# Patient Record
Sex: FEMALE | Race: OTHER | NOT HISPANIC OR LATINO | Employment: OTHER | URBAN - METROPOLITAN AREA
[De-identification: names, ages, dates, MRNs, and addresses within clinical notes are randomized per-mention and may not be internally consistent; named-entity substitution may affect disease eponyms.]

---

## 2023-08-16 DIAGNOSIS — I48.0 PAROXYSMAL ATRIAL FIBRILLATION: Primary | ICD-10-CM

## 2023-08-23 ENCOUNTER — TELEPHONE (OUTPATIENT)
Dept: ELECTROPHYSIOLOGY | Facility: CLINIC | Age: 83
End: 2023-08-23
Payer: MEDICARE

## 2023-08-28 PROBLEM — I77.9 DISORDER OF CAROTID ARTERY: Status: ACTIVE | Noted: 2023-08-28

## 2023-08-28 PROBLEM — I48.19 PERSISTENT ATRIAL FIBRILLATION: Status: ACTIVE | Noted: 2023-08-28

## 2023-08-28 PROBLEM — I20.89 ATYPICAL ANGINA: Status: ACTIVE | Noted: 2023-08-28

## 2023-08-28 NOTE — PROGRESS NOTES
Subjective:   Patient ID:  Trudy Greenfield is a 82 y.o. female who presents for evaluation of Atrial Fibrillation    Referring Electrophysiologist: Justin Nelson MD    HPI  I had the pleasure of seeing Mrs. Greenfield today in our electrophysiology clinic in consultation for her atrial fibrillation. As you are aware she is a pleasant 82 year-old woman with coronary artery disease s/p PCI, hypertension, CKD stage III, and persistent AF s/p cryoablation in 2021 with subsequent recurrence. Her recurrences have been of persistent AF/atypical atrial flutter. She had repeat cardioversions and placed on amiodarone which she has failed. She feels miserable when not in sinus rhythm. Dr. Nelson did not recommend a repeat ablation and recommend PPM/AVN ablation. She did not desire this unless there was no other option. She is requesting consideration for redo-ablation. At her initial procedure she underwent PVI with cryo and developed atrial flutter. She had a LA roof line and what sounds like anterior mitral isthmus block. She than had CTI ablation with an RF catheter.     My interpretation of today's in-clinic ECG is atypical atrial flutter with variable AV block    Review of Systems   Constitutional: Positive for malaise/fatigue. Negative for fever.   HENT:  Negative for congestion and sore throat.    Eyes:  Negative for blurred vision and visual disturbance.   Cardiovascular:  Negative for chest pain, dyspnea on exertion, irregular heartbeat, near-syncope, palpitations and syncope.   Respiratory:  Negative for cough and shortness of breath.    Hematologic/Lymphatic: Negative for bleeding problem. Does not bruise/bleed easily.   Skin: Negative.    Musculoskeletal: Negative.    Gastrointestinal:  Negative for bloating, abdominal pain, hematochezia and melena.   Neurological:  Negative for focal weakness and weakness.   Psychiatric/Behavioral: Negative.         Objective:   Physical Exam  Vitals reviewed.   Constitutional:        General: She is not in acute distress.     Appearance: She is well-developed. She is not diaphoretic.   HENT:      Head: Normocephalic and atraumatic.   Eyes:      General:         Right eye: No discharge.         Left eye: No discharge.      Conjunctiva/sclera: Conjunctivae normal.   Cardiovascular:      Rate and Rhythm: Normal rate. Rhythm irregular.      Heart sounds: No murmur heard.     No friction rub. No gallop.   Pulmonary:      Effort: Pulmonary effort is normal. No respiratory distress.      Breath sounds: Normal breath sounds. No wheezing or rales.   Abdominal:      General: Bowel sounds are normal. There is no distension.      Palpations: Abdomen is soft.      Tenderness: There is no abdominal tenderness.   Musculoskeletal:      Cervical back: Neck supple.   Skin:     General: Skin is warm and dry.   Neurological:      Mental Status: She is alert and oriented to person, place, and time.   Psychiatric:         Behavior: Behavior normal.         Thought Content: Thought content normal.         Judgment: Judgment normal.         Assessment:      1. Persistent atrial fibrillation    2. Atypical atrial flutter    3. Disorder of carotid artery    4. Essential hypertension    5. Renal artery stenosis    6. Coronary artery disease involving native coronary artery of native heart without angina pectoris (had prior PCI)        Plan:   In summary, Mrs. Greenfield is a pleasant 82 year-old woman with coronary artery disease s/p PCI, hypertension, CKD stage III, and persistent AF s/p cryoablation in 2021 with subsequent recurrence despite amiodarone. She is in an atypical atrial flutter. She had a CTI ablation and LA roof line and (?)anterior mitral isthmus(?) ablation with cryo. She declines PPM/AVN currently. I offered redo-ablation. I spent about a half hour discussing the nature of PVI including transseptal puncture. We discussed risks and benefits at length. Our discussion included, but was not limited to the  risk of death, infection, bleeding, stroke, MI, cardiac perforation, embolism, cardiac tamponade, vascular injury, valvular injury, AE fistula, injury to phrenic nerve, pulmonary vein stenosis and other organic injury including the possibility for need for surgery or pacemaker implantation.  She understood and desires to proceed. All questions answered.    Plan  Redo-PVI, atypical atrial flutter ablation  Carto  Anesthesia  LEONEL day of, cancel if in sinus  Hold eliquis AM of procedure  Will resume amiodarone after  Will give 1 month of protonix after    Thank you for allowing me to participate in the care of this patient. Please do not hesitate to call me with any questions or concerns.    Steven Zhou MD, PhD  Cardiac Electrophysiology

## 2023-08-29 ENCOUNTER — HOSPITAL ENCOUNTER (OUTPATIENT)
Dept: CARDIOLOGY | Facility: CLINIC | Age: 83
Discharge: HOME | End: 2023-08-29
Payer: MEDICARE

## 2023-08-29 ENCOUNTER — TELEPHONE (OUTPATIENT)
Dept: ELECTROPHYSIOLOGY | Facility: CLINIC | Age: 83
End: 2023-08-29
Payer: MEDICARE

## 2023-08-29 ENCOUNTER — OFFICE VISIT (OUTPATIENT)
Dept: ELECTROPHYSIOLOGY | Facility: CLINIC | Age: 83
End: 2023-08-29
Payer: MEDICARE

## 2023-08-29 VITALS
HEIGHT: 68 IN | BODY MASS INDEX: 32.61 KG/M2 | WEIGHT: 215.19 LBS | HEART RATE: 97 BPM | SYSTOLIC BLOOD PRESSURE: 128 MMHG | DIASTOLIC BLOOD PRESSURE: 60 MMHG

## 2023-08-29 DIAGNOSIS — I10 ESSENTIAL HYPERTENSION: ICD-10-CM

## 2023-08-29 DIAGNOSIS — I48.4 ATYPICAL ATRIAL FLUTTER: ICD-10-CM

## 2023-08-29 DIAGNOSIS — I77.9 DISORDER OF CAROTID ARTERY: ICD-10-CM

## 2023-08-29 DIAGNOSIS — I25.10 CORONARY ARTERY DISEASE INVOLVING NATIVE CORONARY ARTERY OF NATIVE HEART WITHOUT ANGINA PECTORIS: ICD-10-CM

## 2023-08-29 DIAGNOSIS — I70.1 RENAL ARTERY STENOSIS: ICD-10-CM

## 2023-08-29 DIAGNOSIS — I48.19 PERSISTENT ATRIAL FIBRILLATION: Primary | ICD-10-CM

## 2023-08-29 DIAGNOSIS — I48.0 PAROXYSMAL ATRIAL FIBRILLATION: ICD-10-CM

## 2023-08-29 PROCEDURE — 93005 RHYTHM STRIP: ICD-10-PCS | Mod: S$GLB,,, | Performed by: INTERNAL MEDICINE

## 2023-08-29 PROCEDURE — 1160F RVW MEDS BY RX/DR IN RCRD: CPT | Mod: CPTII,S$GLB,, | Performed by: INTERNAL MEDICINE

## 2023-08-29 PROCEDURE — 1160F PR REVIEW ALL MEDS BY PRESCRIBER/CLIN PHARMACIST DOCUMENTED: ICD-10-PCS | Mod: CPTII,S$GLB,, | Performed by: INTERNAL MEDICINE

## 2023-08-29 PROCEDURE — 93010 ELECTROCARDIOGRAM REPORT: CPT | Mod: S$GLB,,, | Performed by: INTERNAL MEDICINE

## 2023-08-29 PROCEDURE — 1159F PR MEDICATION LIST DOCUMENTED IN MEDICAL RECORD: ICD-10-PCS | Mod: CPTII,S$GLB,, | Performed by: INTERNAL MEDICINE

## 2023-08-29 PROCEDURE — 1101F PT FALLS ASSESS-DOCD LE1/YR: CPT | Mod: CPTII,S$GLB,, | Performed by: INTERNAL MEDICINE

## 2023-08-29 PROCEDURE — 3288F FALL RISK ASSESSMENT DOCD: CPT | Mod: CPTII,S$GLB,, | Performed by: INTERNAL MEDICINE

## 2023-08-29 PROCEDURE — 1159F MED LIST DOCD IN RCRD: CPT | Mod: CPTII,S$GLB,, | Performed by: INTERNAL MEDICINE

## 2023-08-29 PROCEDURE — 3074F PR MOST RECENT SYSTOLIC BLOOD PRESSURE < 130 MM HG: ICD-10-PCS | Mod: CPTII,S$GLB,, | Performed by: INTERNAL MEDICINE

## 2023-08-29 PROCEDURE — 3078F PR MOST RECENT DIASTOLIC BLOOD PRESSURE < 80 MM HG: ICD-10-PCS | Mod: CPTII,S$GLB,, | Performed by: INTERNAL MEDICINE

## 2023-08-29 PROCEDURE — 99999 PR PBB SHADOW E&M-EST. PATIENT-LVL III: ICD-10-PCS | Mod: PBBFAC,,, | Performed by: INTERNAL MEDICINE

## 2023-08-29 PROCEDURE — 3074F SYST BP LT 130 MM HG: CPT | Mod: CPTII,S$GLB,, | Performed by: INTERNAL MEDICINE

## 2023-08-29 PROCEDURE — 1126F PR PAIN SEVERITY QUANTIFIED, NO PAIN PRESENT: ICD-10-PCS | Mod: CPTII,S$GLB,, | Performed by: INTERNAL MEDICINE

## 2023-08-29 PROCEDURE — 93010 RHYTHM STRIP: ICD-10-PCS | Mod: S$GLB,,, | Performed by: INTERNAL MEDICINE

## 2023-08-29 PROCEDURE — 99205 PR OFFICE/OUTPT VISIT, NEW, LEVL V, 60-74 MIN: ICD-10-PCS | Mod: S$GLB,,, | Performed by: INTERNAL MEDICINE

## 2023-08-29 PROCEDURE — 99999 PR PBB SHADOW E&M-EST. PATIENT-LVL III: CPT | Mod: PBBFAC,,, | Performed by: INTERNAL MEDICINE

## 2023-08-29 PROCEDURE — 93005 ELECTROCARDIOGRAM TRACING: CPT | Mod: S$GLB,,, | Performed by: INTERNAL MEDICINE

## 2023-08-29 PROCEDURE — 3288F PR FALLS RISK ASSESSMENT DOCUMENTED: ICD-10-PCS | Mod: CPTII,S$GLB,, | Performed by: INTERNAL MEDICINE

## 2023-08-29 PROCEDURE — 1126F AMNT PAIN NOTED NONE PRSNT: CPT | Mod: CPTII,S$GLB,, | Performed by: INTERNAL MEDICINE

## 2023-08-29 PROCEDURE — 1101F PR PT FALLS ASSESS DOC 0-1 FALLS W/OUT INJ PAST YR: ICD-10-PCS | Mod: CPTII,S$GLB,, | Performed by: INTERNAL MEDICINE

## 2023-08-29 PROCEDURE — 3078F DIAST BP <80 MM HG: CPT | Mod: CPTII,S$GLB,, | Performed by: INTERNAL MEDICINE

## 2023-08-29 PROCEDURE — 99205 OFFICE O/P NEW HI 60 MIN: CPT | Mod: S$GLB,,, | Performed by: INTERNAL MEDICINE

## 2023-08-29 RX ORDER — EZETIMIBE 10 MG/1
10 TABLET ORAL DAILY
COMMUNITY
End: 2023-09-12 | Stop reason: SDUPTHER

## 2023-08-29 RX ORDER — AMIODARONE HYDROCHLORIDE 200 MG/1
1 TABLET ORAL 2 TIMES DAILY
COMMUNITY
End: 2023-09-12

## 2023-08-29 RX ORDER — LEVOTHYROXINE SODIUM 100 UG/1
100 TABLET ORAL
COMMUNITY

## 2023-08-29 RX ORDER — ASPIRIN 81 MG/1
81 TABLET ORAL
COMMUNITY

## 2023-08-29 RX ORDER — METOPROLOL SUCCINATE 100 MG/1
100 TABLET, EXTENDED RELEASE ORAL DAILY
COMMUNITY
End: 2023-09-12 | Stop reason: SDUPTHER

## 2023-08-29 RX ORDER — CHLORTHALIDONE 25 MG/1
25 TABLET ORAL DAILY
COMMUNITY
End: 2023-09-12 | Stop reason: SDUPTHER

## 2023-08-29 RX ORDER — ATORVASTATIN CALCIUM 20 MG/1
20 TABLET, FILM COATED ORAL DAILY
COMMUNITY
End: 2023-09-12 | Stop reason: SDUPTHER

## 2023-08-29 RX ORDER — MAGNESIUM GLUCONATE 27.5 (500)
TABLET ORAL ONCE
COMMUNITY
End: 2023-09-12 | Stop reason: DRUGHIGH

## 2023-08-29 RX ORDER — FUROSEMIDE 20 MG/1
20 TABLET ORAL
COMMUNITY
End: 2023-09-12 | Stop reason: SDUPTHER

## 2023-08-29 RX ORDER — FERROUS SULFATE 325(65) MG
325 TABLET ORAL
COMMUNITY

## 2023-08-29 RX ORDER — FLUTICASONE PROPIONATE 50 UG/1
2 POWDER, METERED RESPIRATORY (INHALATION) DAILY
COMMUNITY

## 2023-08-29 RX ORDER — LORATADINE 10 MG/1
10 TABLET ORAL DAILY
COMMUNITY

## 2023-08-29 NOTE — TELEPHONE ENCOUNTER
Spoke with Patient . Scheduled for PVI ablation  procedure on 11/13/2023 with Dr Zhou. Procedure details reviewed and advised that pre procedure patient instructions will be sent via portal and mail as requested. Advised to call the office for any questions or concerns prior to scheduled procedure. Understanding verbalized.

## 2023-09-12 ENCOUNTER — OFFICE VISIT (OUTPATIENT)
Dept: CARDIOLOGY | Facility: CLINIC | Age: 83
End: 2023-09-12
Payer: MEDICARE

## 2023-09-12 ENCOUNTER — TELEPHONE (OUTPATIENT)
Dept: ELECTROPHYSIOLOGY | Facility: CLINIC | Age: 83
End: 2023-09-12
Payer: MEDICARE

## 2023-09-12 VITALS
BODY MASS INDEX: 32.29 KG/M2 | WEIGHT: 213.06 LBS | DIASTOLIC BLOOD PRESSURE: 64 MMHG | HEIGHT: 68 IN | SYSTOLIC BLOOD PRESSURE: 130 MMHG | HEART RATE: 88 BPM

## 2023-09-12 DIAGNOSIS — I48.19 PERSISTENT ATRIAL FIBRILLATION: Primary | ICD-10-CM

## 2023-09-12 DIAGNOSIS — I25.10 CORONARY ARTERY DISEASE INVOLVING NATIVE CORONARY ARTERY OF NATIVE HEART WITHOUT ANGINA PECTORIS: ICD-10-CM

## 2023-09-12 DIAGNOSIS — I65.21 STENOSIS OF RIGHT CAROTID ARTERY: ICD-10-CM

## 2023-09-12 DIAGNOSIS — E78.00 HYPERCHOLESTEREMIA: ICD-10-CM

## 2023-09-12 DIAGNOSIS — I70.1 RENAL ARTERY STENOSIS: ICD-10-CM

## 2023-09-12 DIAGNOSIS — Z95.5 S/P CORONARY ARTERY STENT PLACEMENT: ICD-10-CM

## 2023-09-12 DIAGNOSIS — I10 PRIMARY HYPERTENSION: ICD-10-CM

## 2023-09-12 PROCEDURE — 99214 PR OFFICE/OUTPT VISIT, EST, LEVL IV, 30-39 MIN: ICD-10-PCS | Mod: S$GLB,,, | Performed by: INTERNAL MEDICINE

## 2023-09-12 PROCEDURE — 1126F AMNT PAIN NOTED NONE PRSNT: CPT | Mod: CPTII,S$GLB,, | Performed by: INTERNAL MEDICINE

## 2023-09-12 PROCEDURE — 1160F PR REVIEW ALL MEDS BY PRESCRIBER/CLIN PHARMACIST DOCUMENTED: ICD-10-PCS | Mod: CPTII,S$GLB,, | Performed by: INTERNAL MEDICINE

## 2023-09-12 PROCEDURE — 1126F PR PAIN SEVERITY QUANTIFIED, NO PAIN PRESENT: ICD-10-PCS | Mod: CPTII,S$GLB,, | Performed by: INTERNAL MEDICINE

## 2023-09-12 PROCEDURE — 3078F DIAST BP <80 MM HG: CPT | Mod: CPTII,S$GLB,, | Performed by: INTERNAL MEDICINE

## 2023-09-12 PROCEDURE — 1159F PR MEDICATION LIST DOCUMENTED IN MEDICAL RECORD: ICD-10-PCS | Mod: CPTII,S$GLB,, | Performed by: INTERNAL MEDICINE

## 2023-09-12 PROCEDURE — 1159F MED LIST DOCD IN RCRD: CPT | Mod: CPTII,S$GLB,, | Performed by: INTERNAL MEDICINE

## 2023-09-12 PROCEDURE — 99999 PR PBB SHADOW E&M-EST. PATIENT-LVL III: ICD-10-PCS | Mod: PBBFAC,,, | Performed by: INTERNAL MEDICINE

## 2023-09-12 PROCEDURE — 1100F PTFALLS ASSESS-DOCD GE2>/YR: CPT | Mod: CPTII,S$GLB,, | Performed by: INTERNAL MEDICINE

## 2023-09-12 PROCEDURE — 3288F FALL RISK ASSESSMENT DOCD: CPT | Mod: CPTII,S$GLB,, | Performed by: INTERNAL MEDICINE

## 2023-09-12 PROCEDURE — 1160F RVW MEDS BY RX/DR IN RCRD: CPT | Mod: CPTII,S$GLB,, | Performed by: INTERNAL MEDICINE

## 2023-09-12 PROCEDURE — 99214 OFFICE O/P EST MOD 30 MIN: CPT | Mod: S$GLB,,, | Performed by: INTERNAL MEDICINE

## 2023-09-12 PROCEDURE — 3078F PR MOST RECENT DIASTOLIC BLOOD PRESSURE < 80 MM HG: ICD-10-PCS | Mod: CPTII,S$GLB,, | Performed by: INTERNAL MEDICINE

## 2023-09-12 PROCEDURE — 1100F PR PT FALLS ASSESS DOC 2+ FALLS/FALL W/INJURY/YR: ICD-10-PCS | Mod: CPTII,S$GLB,, | Performed by: INTERNAL MEDICINE

## 2023-09-12 PROCEDURE — 3288F PR FALLS RISK ASSESSMENT DOCUMENTED: ICD-10-PCS | Mod: CPTII,S$GLB,, | Performed by: INTERNAL MEDICINE

## 2023-09-12 PROCEDURE — 3075F SYST BP GE 130 - 139MM HG: CPT | Mod: CPTII,S$GLB,, | Performed by: INTERNAL MEDICINE

## 2023-09-12 PROCEDURE — 99999 PR PBB SHADOW E&M-EST. PATIENT-LVL III: CPT | Mod: PBBFAC,,, | Performed by: INTERNAL MEDICINE

## 2023-09-12 PROCEDURE — 3075F PR MOST RECENT SYSTOLIC BLOOD PRESS GE 130-139MM HG: ICD-10-PCS | Mod: CPTII,S$GLB,, | Performed by: INTERNAL MEDICINE

## 2023-09-12 RX ORDER — ATORVASTATIN CALCIUM 20 MG/1
20 TABLET, FILM COATED ORAL DAILY
Qty: 90 TABLET | Refills: 3 | Status: SHIPPED | OUTPATIENT
Start: 2023-09-12

## 2023-09-12 RX ORDER — FUROSEMIDE 20 MG/1
20 TABLET ORAL
Qty: 30 TABLET | Refills: 6 | Status: SHIPPED | OUTPATIENT
Start: 2023-09-12

## 2023-09-12 RX ORDER — EZETIMIBE 10 MG/1
10 TABLET ORAL DAILY
Qty: 90 TABLET | Refills: 3 | Status: SHIPPED | OUTPATIENT
Start: 2023-09-12

## 2023-09-12 RX ORDER — METOPROLOL SUCCINATE 100 MG/1
TABLET, EXTENDED RELEASE ORAL
Qty: 270 TABLET | Refills: 3 | Status: ON HOLD | OUTPATIENT
Start: 2023-09-12 | End: 2023-11-14 | Stop reason: SDUPTHER

## 2023-09-12 RX ORDER — CHLORTHALIDONE 25 MG/1
25 TABLET ORAL DAILY
Qty: 90 TABLET | Refills: 3 | Status: SHIPPED | OUTPATIENT
Start: 2023-09-12

## 2023-09-12 NOTE — PROGRESS NOTES
Subjective:     Chief Complaint:  Trudy Greenfield is a 83 y.o. female referred by Derrell Freeman MD for evaluation of Coronary Artery Disease and Hypertension.    Problem List and HPI:   CAD  - LAD stent 6/2023  CEA 2018  Atrial fib    Accompanied by her daughter Katalina.  This is her first visit with me.  She used to see Dr. Zen Garber, then Dr. Alejandrina Ruiz at . Dr. Tierney was her vascular surgeon. Dr. Ocasio was her electrophysiologist at  but he is leaving. She follows w Dr. Yoon in Nephrology.  She has been in atrial fib since 6/2023 and has undergone cardioversion 3 times.  Dr. Zhou plans to perform an ablation in [facility].  She takes Lasix p.r.n. for weight gain.  She has required it only 3 times in the past 6 weeks.  20 mg did not work so she has taken 40 mg        Review of patient's allergies indicates:   Allergen Reactions    Iodine Other (See Comments)    Codeine Rash        Current Outpatient Medications   Medication Sig    apixaban (ELIQUIS) 5 mg Tab Take 5 mg by mouth 2 (two) times a day.    aspirin (ECOTRIN) 81 MG EC tablet Take 81 mg by mouth every Mon, Wed, Fri.    atorvastatin (LIPITOR) 20 MG tablet Take 20 mg by mouth once daily.    chlorthalidone (HYGROTEN) 25 MG Tab Take 25 mg by mouth once daily.    ezetimibe (ZETIA) 10 mg tablet Take 10 mg by mouth once daily.    ferrous sulfate (FEOSOL) 325 mg (65 mg iron) Tab tablet Take 325 mg by mouth daily with breakfast.    fluticasone propionate (FLOVENT DISKUS) 50 mcg/actuation DsDv Inhale 2 sprays into the lungs once daily. Controller    furosemide (LASIX) 20 MG tablet Take 20 mg by mouth as needed.    levothyroxine (SYNTHROID) 100 MCG tablet Take 100 mcg by mouth before breakfast.    loratadine (CLARITIN) 10 mg tablet Take 10 mg by mouth once daily.    MAGNESIUM GLYCINATE ORAL Take 1 tablet by mouth 3 (three) times daily. 400mg total for the day    metoprolol succinate (TOPROL-XL) 100 MG 24 hr tablet Take 100 mg by mouth once  "daily. 200mg in AM, 100mg in PM    ubidecarenone (COENZYME Q10 ORAL) Take 1 capsule by mouth once daily.    vit A/vit C/vit E/zinc/copper (PRESERVISION AREDS ORAL) Take 1 tablet by mouth 2 (two) times a day.         Past Medical and Surgical history:  Trudy Greenfield  has no past medical history on file.   No past surgical history on file.    Social history:  Trudy Greenfield      Family history:  Trudy's family history is not on file.      Objective:   /64   Pulse 88   Ht 5' 8" (1.727 m)   Wt 96.6 kg (213 lb 1.2 oz)   LMP  (LMP Unknown)   BMI 32.40 kg/m²    Physical Exam  Constitutional:       Appearance: Normal appearance. She is well-developed.   Neck:      Vascular: No JVD.   Cardiovascular:      Rate and Rhythm: Normal rate and regular rhythm.      Pulses:           Radial pulses are 2+ on the right side and 2+ on the left side.      Heart sounds: S1 normal and S2 normal. No murmur heard.  Pulmonary:      Breath sounds: No decreased breath sounds, wheezing or rales.   Chest:      Chest wall: There is no dullness to percussion.   Abdominal:      Palpations: Abdomen is soft. There is no hepatomegaly or splenomegaly.      Tenderness: There is no abdominal tenderness.   Musculoskeletal:      Right lower leg: No edema.      Left lower leg: No edema.   Skin:     General: Skin is warm.      Findings: No bruising.      Nails: There is no clubbing.   Neurological:      Mental Status: She is alert and oriented to person, place, and time.   Psychiatric:         Speech: Speech normal.         Behavior: Behavior normal.           Labs  Lipids:  Recent Labs   Lab 05/03/23  0859   LDL Calculated 68   HDL 62   Cholesterol 141            Assessment and Plan:       ICD-10-CM ICD-9-CM   1. Persistent atrial fibrillation  I48.19 427.31   2. Renal artery stenosis  I70.1 440.1   3. CAD w/o angina  I25.10 414.01   4. S/P coronary artery stent placement  Z95.5 V45.82   5. Stenosis of right carotid artery s/p CEA  I65.21 433.10 "   6. Hypercholesteremia  E78.00 272.0   7. Primary hypertension  I10 401.9            Orders entered during this encounter:    Persistent atrial fibrillation  -     apixaban (ELIQUIS) 5 mg Tab; Take 1 tablet (5 mg total) by mouth 2 (two) times a day.  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  Dispense: 90 tablet; Refill: 3  -     atorvastatin (LIPITOR) 20 MG tablet; Take 1 tablet (20 mg total) by mouth once daily.  Dispense: 90 tablet; Refill: 3  -     Lipid Panel; Future; Expected date: 01/22/2024    Primary hypertension  -     chlorthalidone (HYGROTEN) 25 MG Tab; Take 1 tablet (25 mg total) by mouth once daily.  Dispense: 90 tablet; Refill: 3  -     Comprehensive Metabolic Panel; Future; Expected date: 01/22/2024    Other orders  -     furosemide (LASIX) 20 MG tablet; Take 1 tablet (20 mg total) by mouth as needed.  Dispense: 30 tablet; Refill: 6         No follow-ups on file.

## 2023-09-20 DIAGNOSIS — I48.19 PERSISTENT ATRIAL FIBRILLATION: Primary | ICD-10-CM

## 2023-09-21 ENCOUNTER — PATIENT MESSAGE (OUTPATIENT)
Dept: ELECTROPHYSIOLOGY | Facility: CLINIC | Age: 83
End: 2023-09-21
Payer: MEDICARE

## 2023-11-06 LAB
ANION GAP SERPL CALC-SCNC: 7 MMOL/L (ref 8–16)
APTT PPP: 36.3 SECONDS (ref 22.7–33.4)
BUN BLD-MCNC: 31 MG/DL (ref 7–25)
CALCIUM SERPL-MCNC: 8.6 MG/DL (ref 8.4–10.3)
CHLORIDE SERPL-SCNC: 104 MMOL/L (ref 96–110)
CO2 SERPL-SCNC: 32 MMOL/L (ref 24–32)
CREAT SERPL-MCNC: 1.33 MG/DL (ref 0.5–1.1)
EGFR: 40 ML/MIN/1.73M2
ERYTHROCYTE [DISTWIDTH] IN BLOOD BY AUTOMATED COUNT: 16.6 % (ref 11.5–14.5)
GLUCOSE SERPL-MCNC: 99 MG/DL (ref 65–99)
HCT VFR BLD AUTO: 44.4 % (ref 35–46)
HGB BLD-MCNC: 14.2 GM/DL (ref 12–16)
INR PPP: 1.2 (ref 0.8–1.2)
MCH RBC QN AUTO: 28.6 PG (ref 26–34)
MCHC RBC AUTO-ENTMCNC: 32.1 G/DL
MCV RBC AUTO: 89.2 FL (ref 80–100)
PLATELET # BLD AUTO: 183 THOUSAND/UL (ref 130–400)
PMV BLD AUTO: 10.6 FL (ref 7.4–10.4)
POTASSIUM SERPL-SCNC: 4.7 MMOL/L (ref 3.6–5.2)
PROTHROMBIN TIME: 15.6 SECONDS (ref 12.3–14.7)
RBC # BLD AUTO: 4.98 MILLION/UL (ref 4–5.2)
SODIUM BLD-SCNC: 143 MMOL/L (ref 135–146)
WBC # BLD AUTO: 8 THOUSAND/UL (ref 4.5–11)

## 2023-11-08 DIAGNOSIS — I65.21 STENOSIS OF RIGHT CAROTID ARTERY: Primary | ICD-10-CM

## 2023-11-10 ENCOUNTER — TELEPHONE (OUTPATIENT)
Dept: ELECTROPHYSIOLOGY | Facility: CLINIC | Age: 83
End: 2023-11-10
Payer: MEDICARE

## 2023-11-10 NOTE — TELEPHONE ENCOUNTER
Spoke to Patient    CONFIRMED procedure arrival time of 10 am on 11/10/2023 for PVI /Atyp AFL ablation with Dr Zhou.     Reiterated instructions including:    -Directions to check in desk.    -NPO after midnight night prior to procedure. Fasting upon arrival to the hospital the day of the procedure. .    -High importance of HOLDING Metoprolol x 2 : Eliquis and Lasix to be held the morning of the procedure.     -Confirmed compliance of Eliquis as prescribed    -Pre-procedure LABS reviewed with no alerts noted.     -Confirmed absence or presence of implanted device/stimulator N/A    -Confirmed no recent or current fever, cough, or shortness of breath .    -Confirmed no redness, rash, irritation, or yeast infection to skin/ chest / groin area.         -Bathe night prior and morning prior to procedure with Hibiclens solution or an antibacterial soap  -Reviewed current visitor policy    Patient verbalized understanding of above, denies any further questions and appreciated the call.   
None

## 2023-11-13 ENCOUNTER — ANESTHESIA EVENT (OUTPATIENT)
Dept: MEDSURG UNIT | Facility: HOSPITAL | Age: 83
End: 2023-11-13
Payer: MEDICARE

## 2023-11-13 ENCOUNTER — HOSPITAL ENCOUNTER (OUTPATIENT)
Dept: CARDIOLOGY | Facility: HOSPITAL | Age: 83
Discharge: HOME | End: 2023-11-13
Attending: INTERNAL MEDICINE | Admitting: INTERNAL MEDICINE
Payer: MEDICARE

## 2023-11-13 ENCOUNTER — HOSPITAL ENCOUNTER (OUTPATIENT)
Facility: HOSPITAL | Age: 83
Discharge: HOME | End: 2023-11-14
Attending: INTERNAL MEDICINE | Admitting: INTERNAL MEDICINE
Payer: MEDICARE

## 2023-11-13 ENCOUNTER — ANESTHESIA (OUTPATIENT)
Dept: MEDSURG UNIT | Facility: HOSPITAL | Age: 83
End: 2023-11-13
Payer: MEDICARE

## 2023-11-13 VITALS
SYSTOLIC BLOOD PRESSURE: 135 MMHG | BODY MASS INDEX: 32.43 KG/M2 | WEIGHT: 214 LBS | HEIGHT: 68 IN | DIASTOLIC BLOOD PRESSURE: 81 MMHG

## 2023-11-13 DIAGNOSIS — I48.19 PERSISTENT ATRIAL FIBRILLATION: ICD-10-CM

## 2023-11-13 DIAGNOSIS — I48.92 ATRIAL FIBRILLATION AND FLUTTER: Primary | ICD-10-CM

## 2023-11-13 DIAGNOSIS — I48.91 ATRIAL FIBRILLATION AND FLUTTER: Primary | ICD-10-CM

## 2023-11-13 DIAGNOSIS — I48.11 LONGSTANDING PERSISTENT ATRIAL FIBRILLATION: ICD-10-CM

## 2023-11-13 DIAGNOSIS — I49.9 ARRHYTHMIA: ICD-10-CM

## 2023-11-13 DIAGNOSIS — I48.92 ATRIAL FLUTTER: ICD-10-CM

## 2023-11-13 LAB
ASCENDING AORTA: 2.4 CM
BSA FOR ECHO PROCEDURE: 2.16 M2
POC ACTIVATED CLOTTING TIME K: 149 SEC (ref 74–137)
POC ACTIVATED CLOTTING TIME K: 155 SEC (ref 74–137)
POC ACTIVATED CLOTTING TIME K: 354 SEC (ref 74–137)
POC ACTIVATED CLOTTING TIME K: 359 SEC (ref 74–137)
POC ACTIVATED CLOTTING TIME K: 407 SEC (ref 74–137)
POC ACTIVATED CLOTTING TIME K: 510 SEC (ref 74–137)
SAMPLE: ABNORMAL
SINUS: 3 CM
STJ: 2.5 CM

## 2023-11-13 PROCEDURE — 93462 L HRT CATH TRNSPTL PUNCTURE: CPT | Performed by: INTERNAL MEDICINE

## 2023-11-13 PROCEDURE — 93010 ELECTROCARDIOGRAM REPORT: CPT | Mod: ,,, | Performed by: INTERNAL MEDICINE

## 2023-11-13 PROCEDURE — 25000003 PHARM REV CODE 250: Performed by: INTERNAL MEDICINE

## 2023-11-13 PROCEDURE — 36620 ARTERIAL: ICD-10-PCS | Mod: 59,,, | Performed by: ANESTHESIOLOGY

## 2023-11-13 PROCEDURE — 93312 ECHO TRANSESOPHAGEAL: CPT | Mod: 26,,, | Performed by: INTERNAL MEDICINE

## 2023-11-13 PROCEDURE — 93655 ICAR CATH ABLTJ DSCRT ARRHYT: CPT | Mod: ,,, | Performed by: INTERNAL MEDICINE

## 2023-11-13 PROCEDURE — 63600175 PHARM REV CODE 636 W HCPCS: Performed by: STUDENT IN AN ORGANIZED HEALTH CARE EDUCATION/TRAINING PROGRAM

## 2023-11-13 PROCEDURE — D9220A PRA ANESTHESIA: Mod: ANES,,, | Performed by: ANESTHESIOLOGY

## 2023-11-13 PROCEDURE — 93010 EKG 12-LEAD: ICD-10-PCS | Mod: ,,, | Performed by: INTERNAL MEDICINE

## 2023-11-13 PROCEDURE — 93320 DOPPLER ECHO COMPLETE: CPT | Mod: 26,,, | Performed by: INTERNAL MEDICINE

## 2023-11-13 PROCEDURE — D9220A PRA ANESTHESIA: ICD-10-PCS | Mod: CRNA,,, | Performed by: NURSE ANESTHETIST, CERTIFIED REGISTERED

## 2023-11-13 PROCEDURE — 93662 INTRACARDIAC ECG (ICE): CPT | Performed by: INTERNAL MEDICINE

## 2023-11-13 PROCEDURE — 93653 COMPRE EP EVAL TX SVT: CPT | Performed by: INTERNAL MEDICINE

## 2023-11-13 PROCEDURE — 93320 TRANSESOPHAGEAL ECHO (TEE) (CUPID ONLY): ICD-10-PCS | Mod: 26,,, | Performed by: INTERNAL MEDICINE

## 2023-11-13 PROCEDURE — 93325 DOPPLER ECHO COLOR FLOW MAPG: CPT | Mod: 26,,, | Performed by: INTERNAL MEDICINE

## 2023-11-13 PROCEDURE — 37000009 HC ANESTHESIA EA ADD 15 MINS: Performed by: INTERNAL MEDICINE

## 2023-11-13 PROCEDURE — C1732 CATH, EP, DIAG/ABL, 3D/VECT: HCPCS | Performed by: INTERNAL MEDICINE

## 2023-11-13 PROCEDURE — 36620 INSERTION CATHETER ARTERY: CPT | Mod: 59,,, | Performed by: ANESTHESIOLOGY

## 2023-11-13 PROCEDURE — D9220A PRA ANESTHESIA: ICD-10-PCS | Mod: ANES,,, | Performed by: ANESTHESIOLOGY

## 2023-11-13 PROCEDURE — C1753 CATH, INTRAVAS ULTRASOUND: HCPCS | Performed by: INTERNAL MEDICINE

## 2023-11-13 PROCEDURE — 63600175 PHARM REV CODE 636 W HCPCS: Performed by: INTERNAL MEDICINE

## 2023-11-13 PROCEDURE — 93462 PR LEFT HEART CATH BY TRANSEPTAL PUNCTURE: ICD-10-PCS | Mod: ,,, | Performed by: INTERNAL MEDICINE

## 2023-11-13 PROCEDURE — C1766 INTRO/SHEATH,STRBLE,NON-PEEL: HCPCS | Performed by: INTERNAL MEDICINE

## 2023-11-13 PROCEDURE — 37000008 HC ANESTHESIA 1ST 15 MINUTES: Performed by: INTERNAL MEDICINE

## 2023-11-13 PROCEDURE — 63600175 PHARM REV CODE 636 W HCPCS: Performed by: NURSE ANESTHETIST, CERTIFIED REGISTERED

## 2023-11-13 PROCEDURE — 25000003 PHARM REV CODE 250: Performed by: STUDENT IN AN ORGANIZED HEALTH CARE EDUCATION/TRAINING PROGRAM

## 2023-11-13 PROCEDURE — 93653 PR ELECTROPHYS EVAL, COMPREHEN, W/SUPRAVENT TACHYCARD TRMT: ICD-10-PCS | Mod: ,,, | Performed by: INTERNAL MEDICINE

## 2023-11-13 PROCEDURE — 27201423 OPTIME MED/SURG SUP & DEVICES STERILE SUPPLY: Performed by: INTERNAL MEDICINE

## 2023-11-13 PROCEDURE — 93325 DOPPLER ECHO COLOR FLOW MAPG: CPT

## 2023-11-13 PROCEDURE — 93325 TRANSESOPHAGEAL ECHO (TEE) (CUPID ONLY): ICD-10-PCS | Mod: 26,,, | Performed by: INTERNAL MEDICINE

## 2023-11-13 PROCEDURE — 93653 COMPRE EP EVAL TX SVT: CPT | Mod: ,,, | Performed by: INTERNAL MEDICINE

## 2023-11-13 PROCEDURE — 93312 TRANSESOPHAGEAL ECHO (TEE) (CUPID ONLY): ICD-10-PCS | Mod: 26,,, | Performed by: INTERNAL MEDICINE

## 2023-11-13 PROCEDURE — D9220A PRA ANESTHESIA: Mod: CRNA,,, | Performed by: NURSE ANESTHETIST, CERTIFIED REGISTERED

## 2023-11-13 PROCEDURE — 93662 PR INTRACARD ECHO, THER/DX INTERVENT: ICD-10-PCS | Mod: 26,,, | Performed by: INTERNAL MEDICINE

## 2023-11-13 PROCEDURE — 93462 L HRT CATH TRNSPTL PUNCTURE: CPT | Mod: ,,, | Performed by: INTERNAL MEDICINE

## 2023-11-13 PROCEDURE — 93662 INTRACARDIAC ECG (ICE): CPT | Mod: 26,,, | Performed by: INTERNAL MEDICINE

## 2023-11-13 PROCEDURE — 93655 PR ABLATE ARRHYTHMIA ADD ON: ICD-10-PCS | Mod: ,,, | Performed by: INTERNAL MEDICINE

## 2023-11-13 PROCEDURE — C1894 INTRO/SHEATH, NON-LASER: HCPCS | Performed by: INTERNAL MEDICINE

## 2023-11-13 PROCEDURE — C1730 CATH, EP, 19 OR FEW ELECT: HCPCS | Performed by: INTERNAL MEDICINE

## 2023-11-13 PROCEDURE — 93005 ELECTROCARDIOGRAM TRACING: CPT

## 2023-11-13 PROCEDURE — 93655 ICAR CATH ABLTJ DSCRT ARRHYT: CPT | Performed by: INTERNAL MEDICINE

## 2023-11-13 PROCEDURE — 25000003 PHARM REV CODE 250: Performed by: NURSE ANESTHETIST, CERTIFIED REGISTERED

## 2023-11-13 RX ORDER — HEPARIN SODIUM 1000 [USP'U]/ML
INJECTION, SOLUTION INTRAVENOUS; SUBCUTANEOUS
Status: DISCONTINUED | OUTPATIENT
Start: 2023-11-13 | End: 2023-11-13

## 2023-11-13 RX ORDER — HEPARIN SOD,PORCINE/0.9 % NACL 1000/500ML
INTRAVENOUS SOLUTION INTRAVENOUS
Status: DISCONTINUED | OUTPATIENT
Start: 2023-11-13 | End: 2023-11-14 | Stop reason: HOSPADM

## 2023-11-13 RX ORDER — SUCCINYLCHOLINE CHLORIDE 20 MG/ML
INJECTION INTRAMUSCULAR; INTRAVENOUS
Status: DISCONTINUED | OUTPATIENT
Start: 2023-11-13 | End: 2023-11-13

## 2023-11-13 RX ORDER — ATORVASTATIN CALCIUM 20 MG/1
20 TABLET, FILM COATED ORAL DAILY
Status: DISCONTINUED | OUTPATIENT
Start: 2023-11-14 | End: 2023-11-14 | Stop reason: HOSPADM

## 2023-11-13 RX ORDER — ONDANSETRON 2 MG/ML
4 INJECTION INTRAMUSCULAR; INTRAVENOUS ONCE AS NEEDED
Status: CANCELLED | OUTPATIENT
Start: 2023-11-13 | End: 2035-04-11

## 2023-11-13 RX ORDER — SUCRALFATE 1 G/1
1 TABLET ORAL
Status: DISCONTINUED | OUTPATIENT
Start: 2023-11-13 | End: 2023-11-14 | Stop reason: HOSPADM

## 2023-11-13 RX ORDER — PANTOPRAZOLE SODIUM 40 MG/1
40 TABLET, DELAYED RELEASE ORAL DAILY
Status: DISCONTINUED | OUTPATIENT
Start: 2023-11-14 | End: 2023-11-14 | Stop reason: HOSPADM

## 2023-11-13 RX ORDER — DIPHENHYDRAMINE HYDROCHLORIDE 50 MG/ML
25 INJECTION INTRAMUSCULAR; INTRAVENOUS EVERY 6 HOURS PRN
Status: CANCELLED | OUTPATIENT
Start: 2023-11-13

## 2023-11-13 RX ORDER — ACETAMINOPHEN 325 MG/1
650 TABLET ORAL EVERY 4 HOURS PRN
Status: DISCONTINUED | OUTPATIENT
Start: 2023-11-13 | End: 2023-11-14 | Stop reason: HOSPADM

## 2023-11-13 RX ORDER — FENTANYL CITRATE 50 UG/ML
INJECTION, SOLUTION INTRAMUSCULAR; INTRAVENOUS
Status: DISCONTINUED | OUTPATIENT
Start: 2023-11-13 | End: 2023-11-13

## 2023-11-13 RX ORDER — PHENYLEPHRINE HYDROCHLORIDE 10 MG/ML
INJECTION INTRAVENOUS
Status: DISCONTINUED | OUTPATIENT
Start: 2023-11-13 | End: 2023-11-13

## 2023-11-13 RX ORDER — ONDANSETRON 2 MG/ML
INJECTION INTRAMUSCULAR; INTRAVENOUS
Status: DISCONTINUED | OUTPATIENT
Start: 2023-11-13 | End: 2023-11-13

## 2023-11-13 RX ORDER — HEPARIN SODIUM 10000 [USP'U]/100ML
INJECTION, SOLUTION INTRAVENOUS CONTINUOUS PRN
Status: DISCONTINUED | OUTPATIENT
Start: 2023-11-13 | End: 2023-11-13

## 2023-11-13 RX ORDER — PROTAMINE SULFATE 10 MG/ML
INJECTION, SOLUTION INTRAVENOUS
Status: DISCONTINUED | OUTPATIENT
Start: 2023-11-13 | End: 2023-11-13

## 2023-11-13 RX ORDER — HYDROMORPHONE HYDROCHLORIDE 1 MG/ML
0.2 INJECTION, SOLUTION INTRAMUSCULAR; INTRAVENOUS; SUBCUTANEOUS EVERY 5 MIN PRN
Status: CANCELLED | OUTPATIENT
Start: 2023-11-13

## 2023-11-13 RX ORDER — LIDOCAINE HYDROCHLORIDE 20 MG/ML
INJECTION INTRAVENOUS
Status: DISCONTINUED | OUTPATIENT
Start: 2023-11-13 | End: 2023-11-13

## 2023-11-13 RX ORDER — ASPIRIN 81 MG/1
81 TABLET ORAL
Status: DISCONTINUED | OUTPATIENT
Start: 2023-11-13 | End: 2023-11-14 | Stop reason: HOSPADM

## 2023-11-13 RX ORDER — LEVOTHYROXINE SODIUM 100 UG/1
100 TABLET ORAL
Status: DISCONTINUED | OUTPATIENT
Start: 2023-11-14 | End: 2023-11-14 | Stop reason: HOSPADM

## 2023-11-13 RX ORDER — FUROSEMIDE 10 MG/ML
INJECTION INTRAMUSCULAR; INTRAVENOUS
Status: DISCONTINUED | OUTPATIENT
Start: 2023-11-13 | End: 2023-11-13

## 2023-11-13 RX ORDER — PROPOFOL 10 MG/ML
VIAL (ML) INTRAVENOUS
Status: DISCONTINUED | OUTPATIENT
Start: 2023-11-13 | End: 2023-11-13

## 2023-11-13 RX ORDER — METOPROLOL SUCCINATE 50 MG/1
50 TABLET, EXTENDED RELEASE ORAL DAILY
Status: DISCONTINUED | OUTPATIENT
Start: 2023-11-14 | End: 2023-11-14 | Stop reason: HOSPADM

## 2023-11-13 RX ORDER — EZETIMIBE 10 MG/1
10 TABLET ORAL DAILY
Status: DISCONTINUED | OUTPATIENT
Start: 2023-11-14 | End: 2023-11-14 | Stop reason: HOSPADM

## 2023-11-13 RX ORDER — FENTANYL CITRATE 50 UG/ML
25 INJECTION, SOLUTION INTRAMUSCULAR; INTRAVENOUS EVERY 5 MIN PRN
Status: CANCELLED | OUTPATIENT
Start: 2023-11-13

## 2023-11-13 RX ADMIN — ONDANSETRON 4 MG: 2 INJECTION INTRAMUSCULAR; INTRAVENOUS at 04:11

## 2023-11-13 RX ADMIN — HEPARIN SODIUM 15000 UNITS: 1000 INJECTION, SOLUTION INTRAVENOUS; SUBCUTANEOUS at 02:11

## 2023-11-13 RX ADMIN — SUCRALFATE 1 G: 1 TABLET ORAL at 08:11

## 2023-11-13 RX ADMIN — PROTAMINE SULFATE 70 MG: 10 INJECTION, SOLUTION INTRAVENOUS at 04:11

## 2023-11-13 RX ADMIN — SODIUM CHLORIDE 0.25 MCG/KG/MIN: 9 INJECTION, SOLUTION INTRAVENOUS at 02:11

## 2023-11-13 RX ADMIN — SUCCINYLCHOLINE CHLORIDE 100 MG: 20 INJECTION, SOLUTION INTRAMUSCULAR; INTRAVENOUS at 12:11

## 2023-11-13 RX ADMIN — SODIUM CHLORIDE, SODIUM GLUCONATE, SODIUM ACETATE, POTASSIUM CHLORIDE, MAGNESIUM CHLORIDE, SODIUM PHOSPHATE, DIBASIC, AND POTASSIUM PHOSPHATE: .53; .5; .37; .037; .03; .012; .00082 INJECTION, SOLUTION INTRAVENOUS at 12:11

## 2023-11-13 RX ADMIN — FENTANYL CITRATE 50 MCG: 50 INJECTION, SOLUTION INTRAMUSCULAR; INTRAVENOUS at 12:11

## 2023-11-13 RX ADMIN — PHENYLEPHRINE HYDROCHLORIDE 100 MCG: 10 INJECTION INTRAVENOUS at 01:11

## 2023-11-13 RX ADMIN — PHENYLEPHRINE HYDROCHLORIDE 100 MCG: 10 INJECTION INTRAVENOUS at 02:11

## 2023-11-13 RX ADMIN — LIDOCAINE HYDROCHLORIDE 50 MG: 20 INJECTION INTRAVENOUS at 12:11

## 2023-11-13 RX ADMIN — FUROSEMIDE 20 MG: 10 INJECTION, SOLUTION INTRAMUSCULAR; INTRAVENOUS at 04:11

## 2023-11-13 RX ADMIN — APIXABAN 5 MG: 5 TABLET, FILM COATED ORAL at 08:11

## 2023-11-13 RX ADMIN — PROPOFOL 30 MG: 10 INJECTION, EMULSION INTRAVENOUS at 12:11

## 2023-11-13 RX ADMIN — FENTANYL CITRATE 25 MCG: 50 INJECTION, SOLUTION INTRAMUSCULAR; INTRAVENOUS at 04:11

## 2023-11-13 RX ADMIN — HEPARIN SODIUM AND DEXTROSE 12 UNITS/KG/HR: 10000; 5 INJECTION INTRAVENOUS at 02:11

## 2023-11-13 RX ADMIN — PROPOFOL 50 MG: 10 INJECTION, EMULSION INTRAVENOUS at 12:11

## 2023-11-13 RX ADMIN — ACETAMINOPHEN 650 MG: 325 TABLET ORAL at 05:11

## 2023-11-13 RX ADMIN — PROTAMINE SULFATE 5 MG: 10 INJECTION, SOLUTION INTRAVENOUS at 04:11

## 2023-11-13 RX ADMIN — SODIUM CHLORIDE: 0.9 INJECTION, SOLUTION INTRAVENOUS at 12:11

## 2023-11-13 RX ADMIN — SODIUM CHLORIDE, SODIUM GLUCONATE, SODIUM ACETATE, POTASSIUM CHLORIDE, MAGNESIUM CHLORIDE, SODIUM PHOSPHATE, DIBASIC, AND POTASSIUM PHOSPHATE: .53; .5; .37; .037; .03; .012; .00082 INJECTION, SOLUTION INTRAVENOUS at 02:11

## 2023-11-13 NOTE — TRANSFER OF CARE
"Anesthesia Transfer of Care Note    Patient: Trudy Greenfield    Procedure(s) Performed: Procedure(s) (LRB):  Ablation atrial fibrillation (N/A)  ABLATION, ARRHYTHMOGENIC FOCUS, FOR ATYPICAL ATRIAL FLUTTER (N/A)  Transesophageal echo (LEONEL) intra-procedure log documentation (N/A)    Patient location: PACU    Anesthesia Type: general    Transport from OR: Transported from OR on 6-10 L/min O2 by face mask with adequate spontaneous ventilation    Post pain: adequate analgesia    Post assessment: no apparent anesthetic complications and tolerated procedure well    Post vital signs: stable    Level of consciousness: sedated and responds to stimulation    Nausea/Vomiting: no nausea/vomiting    Complications: none    Transfer of care protocol was followed      Last vitals: Visit Vitals  /51 (BP Location: Left forearm, Patient Position: Lying)   Pulse 55   Temp 36.4 °C (97.5 °F) (Temporal)   Resp 20   Ht 5' 8" (1.727 m)   Wt 97.1 kg (214 lb)   LMP  (LMP Unknown)   SpO2 97%   Breastfeeding No   BMI 32.54 kg/m²     "

## 2023-11-13 NOTE — ANESTHESIA PROCEDURE NOTES
Intubation    Date/Time: 11/13/2023 12:54 PM    Performed by: Jewell Sim CRNA  Authorized by: Jewell Sim CRNA    Intubation:     Induction:  Intravenous    Intubated:  Postinduction    Mask Ventilation:  Easy mask    Attempts:  1    Attempted By:  CRNA    Method of Intubation:  Direct    Blade:  Geller 2    Laryngeal View Grade: Grade I - full view of cords      Difficult Airway Encountered?: No      Complications:  None    Airway Device:  Oral endotracheal tube    Airway Device Size:  7.0    Style/Cuff Inflation:  Cuffed (inflated to minimal occlusive pressure)    Tube secured:  22    Secured at:  The lips    Placement Verified By:  Capnometry    Complicating Factors:  None    Findings Post-Intubation:  BS equal bilateral and atraumatic/condition of teeth unchanged

## 2023-11-13 NOTE — DISCHARGE SUMMARY
[facility] - [facility]  Cardiac Electrophysiology  Discharge Summary        Patient Name: Trudy Greenfield   MRN: [id number]   Admission Date: 11/13/2023    Hospital Length of Stay: 0   Discharge Date: 11/14/2023   Attending Physician: Steven Zhou MD    Discharging Provider: Ney Mayfield MD      HPI:   Ms Trudy Greenfield is an 82 year-old woman with coronary artery disease s/p PCI, hypertension, CKD stage III, and persistent AF s/p cryoablation in 2021 with subsequent recurrence. Her recurrences have been of persistent AF/atypical atrial flutter. She had repeat cardioversions and placed on amiodarone which she has failed. She feels miserable when not in sinus rhythm. Dr. Nelson did not recommend a repeat ablation and recommend PPM/AVN ablation. She did not desire this unless there was no other option. She is requesting consideration for redo-ablation. At her initial procedure she underwent PVI with cryo and developed atrial flutter. She had a LA roof line and what sounds like anterior mitral isthmus block. She than had CTI ablation with an RF catheter.      She was recently seen by Dr. Zhou and offered redo PVI and ablation of atypical atrial flutter. She agreed to proceed. She presents today for the aforementioned procedure.         Presenting EKG: Likely atrial fibrillation   CHADS-VASc: 5 (age, gender, atherosclerotic dz, HTN)  Anticoagulants: Apixaban 5 mg BID (last dose yesterday evening)  Antiarrhythmics: None (previously on amiodarone)  LV EF: 50-55% (6/2023 [place] [facility] [place]  Pertinent labs: INR 1.2, Cr 1.33, Hgb 14.2    Hospital Course:   Ms Greenfield underwent uncomplicated posterior wall isolation and ablation of atypical atrial flutter. She tolerated the procedure well with no immediate complications. She completed 6 hours bed rest and observed ambulating without evidence of bleeding/hematoma. She was monitored overnight and maintained sinus rhythm. She was discharged home in stable condition the morning following  procedure. She was given prescription for amiodarone load for anti arrhythmic therapy.     Follow up:   Follow up with Dr. Zhou in 3-4 months     Disposition:   Home or Self Care         Medication List        START taking these medications      * amiodarone 200 MG Tab  Commonly known as: PACERONE  Take 2 tablets (400 mg total) by mouth 2 (two) times daily. for 14 days     * amiodarone 200 MG Tab  Commonly known as: PACERONE  Take 1 tablet (200 mg total) by mouth 2 (two) times daily.  Start taking on: November 28, 2023     pantoprazole 40 MG tablet  Commonly known as: PROTONIX  Take 1 tablet (40 mg total) by mouth once daily.     sucralfate 1 gram tablet  Commonly known as: CARAFATE  Take 1 tablet (1 g total) by mouth 4 (four) times daily before meals and nightly.           * This list has 2 medication(s) that are the same as other medications prescribed for you. Read the directions carefully, and ask your doctor or other care provider to review them with you.                CHANGE how you take these medications      metoprolol succinate 50 MG 24 hr tablet  Commonly known as: TOPROL-XL  Take 1 tablet by mouth daily  What changed:   medication strength  additional instructions            CONTINUE taking these medications      apixaban 5 mg Tab  Commonly known as: ELIQUIS  Take 1 tablet (5 mg total) by mouth 2 (two) times a day.     aspirin 81 MG EC tablet  Commonly known as: ECOTRIN     atorvastatin 20 MG tablet  Commonly known as: LIPITOR  Take 1 tablet (20 mg total) by mouth once daily.     chlorthalidone 25 MG Tab  Commonly known as: HYGROTEN  Take 1 tablet (25 mg total) by mouth once daily.     COENZYME Q10 ORAL     ezetimibe 10 mg tablet  Commonly known as: ZETIA  Take 1 tablet (10 mg total) by mouth once daily.     ferrous sulfate 325 mg (65 mg iron) Tab tablet  Commonly known as: FEOSOL     fluticasone propionate 50 mcg/actuation Dsdv  Commonly known as: FLOVENT DISKUS     furosemide 20 MG tablet  Commonly  known as: LASIX  Take 1 tablet (20 mg total) by mouth as needed.     levothyroxine 100 MCG tablet  Commonly known as: SYNTHROID     loratadine 10 mg tablet  Commonly known as: CLARITIN     MAGNESIUM GLYCINATE ORAL     PRESERVISION AREDS ORAL               Where to Get Your Medications        These medications were sent to [facility] [place]  [address] [address]      Hours: Mon-Fri 7a-7p, Sat-Sun 10a-4p Phone: [phone]   amiodarone 200 MG Tab  amiodarone 200 MG Tab  metoprolol succinate 50 MG 24 hr tablet  pantoprazole 40 MG tablet  sucralfate 1 gram tablet           Ney Mayfield MD  [facility]  Cardiovascular Disease, PGY-VI

## 2023-11-13 NOTE — H&P
[facility] [place]  Electrophysiology  H&P      Trudy Greenfield   YOB: 1940   Medical Record Number: [id number]   Attending Physician:    Date of Admission: 11/13/2023       Hospital Day:  0  Current Principal Problem:  <principal problem not specified>      History     Cc: atrial fibrillation and flutter     HPI  Ms Trudy Greenfield is an 82 year-old woman with coronary artery disease s/p PCI, hypertension, CKD stage III, and persistent AF s/p cryoablation in 2021 with subsequent recurrence. Her recurrences have been of persistent AF/atypical atrial flutter. She had repeat cardioversions and placed on amiodarone which she has failed. She feels miserable when not in sinus rhythm. Dr. Nelson did not recommend a repeat ablation and recommend PPM/AVN ablation. She did not desire this unless there was no other option. She is requesting consideration for redo-ablation. At her initial procedure she underwent PVI with cryo and developed atrial flutter. She had a LA roof line and what sounds like anterior mitral isthmus block. She than had CTI ablation with an RF catheter.      She was recently seen by Dr. Zhou and offered redo PVI and ablation of atypical atrial flutter. She agreed to proceed. She presents today for the aforementioned procedure.       Presenting EKG: Likely atrial fibrillation   CHADS-VASc: 5 (age, gender, atherosclerotic dz, HTN)  Anticoagulants: Apixaban 5 mg BID (last dose yesterday evening)  Antiarrhythmics: None (previously on amiodarone)  LV EF: 50-55% (6/2023 at [facility])  Pertinent labs: INR 1.2, Cr 1.33, Hgb 14.2        Medications - Outpatient  Prior to Admission medications    Medication Sig Start Date End Date Taking? Authorizing Provider   acetaminophen (TYLENOL) 500 MG tablet Take 500-1,000 mg by mouth daily as needed for Pain.    Provider, Historical   apixaban (ELIQUIS) 5 mg Tab Take 1 tablet (5 mg total) by mouth 2 (two) times daily. 12/13/22 12/13/23  Los Ortiz,  MD   atorvastatin (LIPITOR) 20 MG tablet Take 1 tablet (20 mg total) by mouth once daily. 11/2/23   Haylee Barrientos MD   diltiaZEM (CARDIZEM CD) 180 MG 24 hr capsule Take 1 capsule (180 mg total) by mouth once daily. 8/26/23 8/25/24  Stan Blount MD   flecainide (TAMBOCOR) 100 MG Tab Take 1 tablet (100 mg total) by mouth every 12 (twelve) hours. 8/25/23 8/24/24  Stan Blount MD   PFIZER COVID-19 JONATHAN VACCN,PF, 30 mcg/0.3 mL injection  4/20/22   Provider, Historical   sildenafiL (VIAGRA) 100 MG tablet Take 1 tablet by mouth one hour before intercourse 9/19/23   Haylee Barrientos MD   tetrahydrozoline HCl (VISINE OPHT) Place 1 drop into both eyes as needed (Redness).    Provider, Historical   valACYclovir (VALTREX) 1000 MG tablet Take 1 tablet (1,000 mg total) by mouth 2 (two) times daily. 6/23/23 6/22/24  Haylee Barrientos MD   vitamin D 1000 units Tab Take 2,000 Units by mouth once daily.    Provider, Historical         Medications - Current  Scheduled Meds:   perflutren protein-a microsphr  0.5 mL Intravenous 1 time in Clinic/HOD     Continuous Infusions:  PRN Meds:.      Allergies  Review of patient's allergies indicates:  No Known Allergies      Past Medical History  Past Medical History:   Diagnosis Date    Anticoagulant long-term use     Arrhythmia     Arthritis     BPH with urinary obstruction     Cancer     basal cell    Coronary artery disease     Erectile dysfunction due to arterial insufficiency     Family history of prostate cancer in father 7/15/2019    History of cardioversion 4/12/2017    History of prior ablation treatment 2/14/2019    Hyperlipidemia     he refused treatment with medication in the past    Hypertension 4/11/2021    Persistent atrial fibrillation          Past Surgical History  Past Surgical History:   Procedure Laterality Date    ABLATION N/A 11/27/2018    Procedure: Ablation;  Surgeon: Los Ortiz MD;  Location: [facility] EP LAB;  Service: Cardiology;  Laterality: N/A;  AF,  PVI, RFA, CHEYANNE, Gen, SK, 3prep    ATRIAL ABLATION SURGERY      BACK SURGERY      diskectomy    COLONOSCOPY N/A 2018    Procedure: COLONOSCOPY;  Surgeon: Chino Perez MD;  Location: [facility] END[facility] ([address] [place] [address]);  Service: Endoscopy;  Laterality: N/A;  per anesthesia Dr. Scott. Pt. in Afib and has AAA. Holding Xarelto for 2 days prior to procedure. per Dr. Ortiz. EC    SKIN BIOPSY      TONSILLECTOMY      TREATMENT OF CARDIAC ARRHYTHMIA N/A 2019    Procedure: CARDIOVERSION OR DEFIBRILLATION;  Surgeon: Steven Zhou MD;  Location: [facility] EP LAB;  Service: Cardiology;  Laterality: N/A;  afib, dccv only, anes, PA, ED5    TREATMENT OF CARDIAC ARRHYTHMIA N/A 2021    Procedure: CARDIOVERSION;  Surgeon: Ceasar Amaral MD;  Location: [facility] EP LAB;  Service: Cardiology;  Laterality: N/A;  AF, LEONEL/DCCV, ANES, MB,     TREATMENT OF CARDIAC ARRHYTHMIA N/A 2022    Procedure: CARDIOVERSION;  Surgeon: Los Ortiz MD;  Location: [facility] EP LAB;  Service: Cardiology;  Laterality: N/A;  ARIANNA, DCCV, ANES, SK, ED15    TREATMENT OF CARDIAC ARRHYTHMIA N/A 2023    Procedure: Cardioversion or Defibrillation;  Surgeon: Shay King MD;  Location: [facility] EP LAB;  Service: Cardiology;  Laterality: N/A;  AF, DCCV ONLY, ANES, SK, 312    VASECTOMY      WISDOM TOOTH EXTRACTION           Social History  Social History     Socioeconomic History    Marital status:    Occupational History    Occupation: retired   Tobacco Use    Smoking status: Former     Current packs/day: 0.00     Types: Cigarettes     Quit date: 1990     Years since quittin.2    Smokeless tobacco: Never    Tobacco comments:     quit smoking 20 years ago; smoke socially prior to quitting     - Smoked for about 20 years, averaging definitely <1 pack/day and likely <1 pack/month   Substance and Sexual Activity    Alcohol use: Not Currently    Drug use: Yes     Types: Marijuana     Comment: one edible every few months    Sexual activity: Yes      Partners: Female     Social Determinants of Health     Financial Resource Strain: Low Risk  (9/21/2023)    Overall Financial Resource Strain (CARDIA)     Difficulty of Paying Living Expenses: Not hard at all   Food Insecurity: No Food Insecurity (9/21/2023)    Hunger Vital Sign     Worried About Running Out of Food in the Last Year: Never true     Ran Out of Food in the Last Year: Never true   Transportation Needs: No Transportation Needs (9/21/2023)    PRAPARE - Transportation     Lack of Transportation (Medical): No     Lack of Transportation (Non-Medical): No   Physical Activity: Sufficiently Active (9/21/2023)    Exercise Vital Sign     Days of Exercise per Week: 4 days     Minutes of Exercise per Session: 40 min   Stress: No Stress Concern Present (9/21/2023)    [employer] - Occupational Stress Questionnaire     Feeling of Stress : Only a little   Social Connections: Unknown (9/21/2023)    Social Connection and Isolation Panel [NHANES]     Frequency of Communication with Friends and Family: Three times a week     Frequency of Social Gatherings with Friends and Family: Twice a week     Active Member of Clubs or Organizations: Yes     Attends Club or Organization Meetings: 1 to 4 times per year     Marital Status: Living with partner   Housing Stability: Low Risk  (9/21/2023)    Housing Stability Vital Sign     Unable to Pay for Housing in the Last Year: No     Number of Places Lived in the Last Year: 1     Unstable Housing in the Last Year: No         ROS  10 point ROS performed and negative except as stated in HPI     Physical Examination         Vital Signs             24 Hour VS Range    BP: ()/()   Arterial Line BP: ()/()   No intake or output data in the 24 hours ending 11/07/23 0729      Physical Exam:   Constitutional: no acute distress  HEENT: NCAT, EOMI, no scleral icterus  Cardiovascular: Irregularly irregular rhythm   Pulmonary: Normal respiratory effort   Abdomen:  "nontender, non-distended   Neuro: alert and oriented, no focal deficits  Extremities: warm, no edema   MSK: no deformities  Integument: intact, no rashes       Data       Recent Labs   Lab 11/03/23  0846   WBC 6.21   HGB 14.2   HCT 43.5           Recent Labs   Lab 11/03/23  0846   INR 1.1        Recent Labs   Lab 11/03/23  0846      K 4.0      CO2 21*   BUN 12   CREATININE 1.0   ANIONGAP 13   CALCIUM 9.2        No results for input(s): "PROT", "ALBUMIN", "BILITOT", "ALKPHOS", "AST", "ALT" in the last 168 hours.     No results for input(s): "TROPONINI" in the last 168 hours.     BNP (pg/mL)   Date Value   08/25/2023 76   01/14/2022 58   04/11/2021 72   02/14/2019 99   08/04/2018 79       No results for input(s): "LABBLOO" in the last 168 hours.         Assessment & Plan   82 year-old woman with coronary artery disease s/p PCI, hypertension, CKD stage III, and persistent AF s/p cryoablation in 2021 with subsequent recurrence who presents today for redo PVI with Dr. Zhou.     Atrial fibrillation and flutter:   Risks/benefits/alternatives discussed with patient and she agrees to proceed. Consents signed.   -To EP lab for PVI/flutter ablation with Dr. Zhou  -LEONEL prior to rule out ERICA thrombus         Ney Mayfield MD  [facility]   Cardiovascular Disease PGY-VI     "

## 2023-11-13 NOTE — ANESTHESIA PROCEDURE NOTES
Arterial    Diagnosis: [id number]    Patient location during procedure: done in OR    Staffing  Authorizing Provider: Jhony Clark MD  Performing Provider: Jhony Clark MD    Staffing  Performed by: Jhony Clark MD  Authorized by: Jhony Clark MD    Anesthesiologist was present at the time of the procedure.  Arterial  Skin Prep: chlorhexidine gluconate  Local Infiltration: none  Orientation: left  Location: radial    Catheter Size: 20 G  Catheter placement by Ultrasound guidance. Heme positive aspiration all ports.   Vessel Caliber: patent  Needle advanced into vessel with real time Ultrasound guidance.Insertion Attempts: 1  Assessment  Dressing: secured with tape and tegaderm

## 2023-11-13 NOTE — CONSULTS
[facility]  LEONEL Consult      Trudy Greenfield  YOB: 1940  Medical Record Number:  [id number]  Attending Physician:  Steven Zhou MD   Date of Admission: 11/13/2023       Hospital Day:  0  Current Principal Problem:  <principal problem not specified>      History     Mrs. Greenfield is a pleasant 82 year-old woman with coronary artery disease s/p PCI, hypertension, CKD stage III, and persistent AF s/p cryoablation in 2021 with subsequent recurrence despite amiodarone who presents today for redo-PVI/flutter ablation.       Today, denies any complaints and is accompanied by her two daughters.       Anticoagulant/antiplatelets: eliquis  ECG: AFL     Dysphagia or odynophagia:  no  Liver Disease, esophageal disease, or known varices:  no  Upper GI Bleeding: no  Snoring:  no  Sleep Apnea:  no  Prior neck surgery or radiation:  no  History of anesthetic difficulties:  no  Family history of anesthetic difficulties:  no  Last oral intake: last pm   Able to move neck in all directions: yes          Medications - Outpatient  Prior to Admission medications    Medication Sig Start Date End Date Taking? Authorizing Provider   apixaban (ELIQUIS) 5 mg Tab Take 1 tablet (5 mg total) by mouth 2 (two) times a day. 9/12/23  Yes Luis Mendoza MD   aspirin (ECOTRIN) 81 MG EC tablet Take 81 mg by mouth every Mon, Wed, Fri.   Yes Provider, Historical   atorvastatin (LIPITOR) 20 MG tablet Take 1 tablet (20 mg total) by mouth once daily. 9/12/23  Yes Luis Mendoza MD   chlorthalidone (HYGROTEN) 25 MG Tab Take 1 tablet (25 mg total) by mouth once daily. 9/12/23  Yes Luis Mendoza MD   ezetimibe (ZETIA) 10 mg tablet Take 1 tablet (10 mg total) by mouth once daily. 9/12/23  Yes Luis Mendoza MD   ferrous sulfate (FEOSOL) 325 mg (65 mg iron) Tab tablet Take 325 mg by mouth daily with breakfast.   Yes Provider, Historical   fluticasone propionate (FLOVENT DISKUS) 50  mcg/actuation DsDv Inhale 2 sprays into the lungs once daily. Controller   Yes Provider, Historical   furosemide (LASIX) 20 MG tablet Take 1 tablet (20 mg total) by mouth as needed. 9/12/23  Yes Luis Mendoza MD   levothyroxine (SYNTHROID) 100 MCG tablet Take 100 mcg by mouth before breakfast.   Yes Provider, Historical   loratadine (CLARITIN) 10 mg tablet Take 10 mg by mouth once daily.   Yes Provider, Historical   MAGNESIUM GLYCINATE ORAL Take 1 tablet by mouth 3 (three) times daily. 400mg total for the day   Yes Provider, Historical   ubidecarenone (COENZYME Q10 ORAL) Take 1 capsule by mouth once daily.   Yes Provider, Historical   vit A/vit C/vit E/zinc/copper (PRESERVISION AREDS ORAL) Take 1 tablet by mouth 2 (two) times a day.   Yes Provider, Historical   metoprolol succinate (TOPROL-XL) 100 MG 24 hr tablet 2 tab in AM, 1 tab in PM 9/12/23   Luis Mendoza MD         Medications - Current  Scheduled Meds:  Continuous Infusions:  PRN Meds:.      Allergies  Review of patient's allergies indicates:   Allergen Reactions    Iodine Other (See Comments)    Codeine Rash         Past Medical History  Past Medical History:   Diagnosis Date    A-fib     Atrial flutter     Carotid stenosis     Coronary artery disease     Renal artery stenosis          Past Surgical History  Past Surgical History:   Procedure Laterality Date    CAROTID ENDARTERECTOMY Right     CATARACT EXTRACTION Bilateral     CORONARY ANGIOPLASTY WITH STENT PLACEMENT      RENAL ARTERY STENT           Social History  Social History     Socioeconomic History    Marital status:    Tobacco Use    Smoking status: Never    Smokeless tobacco: Never   Substance and Sexual Activity    Alcohol use: Never    Drug use: Never         ROS  10 point ROS performed and negative except as stated in HPI     Physical Examination         Vital Signs  Vitals  Temp: 97.5 °F (36.4 °C)  Temp Source: Temporal  Pulse: 95  Heart Rate Source: Monitor  Resp:  20  SpO2: 100 %  BP: 135/81  MAP (mmHg): 101  BP Location: Left forearm  BP Method: Automatic  Patient Position: Lying          24 Hour VS Range    Temp:  [97.5 °F (36.4 °C)]   Pulse:  [95]   Resp:  [20]   BP: (135-136)/(78-81)   SpO2:  [100 %]   No intake or output data in the 24 hours ending 11/13/23 1213      Physical Exam:   Constitutional: no acute distress  HEENT: NCAT, EOMI, no scleral icterus  Cardiovascular: irregular regular rate and rhythm  Pulmonary: Normal respiratory effort   Abdomen: nontender, non-distended   Neuro: alert and oriented, no focal deficits  Extremities: warm, no edema   MSK: no deformities  Integument: intact, no rashes           Assessment & Plan     AF and Aflutter       We will proceed with LEONEL prior to ablation.   -No absolute contraindications of esophageal stricture, tumor, perforation, laceration,or diverticulum and/or active GI bleed  -The risks, benefits & alternatives of the procedure were explained to the patient.   -The risks of transesophageal echo include but are not limited to:  Dental trauma, esophageal trauma/perforation, bleeding, laryngospasm/brochospasm, aspiration, sore throat/hoarseness, & dislodgement of the endotracheal tube/nasogastric tube (where applicable).    -The risks of moderate sedation include hypotension, respiratory depression, arrhythmias, bronchospasm, & death.    -Informed consent was obtained. The patient is agreeable to proceed with the procedure and all questions and concerns addressed    Phan Mesa MD  [facility]   Cardiovascular Disease PGY-V

## 2023-11-13 NOTE — BRIEF OP NOTE
: Steven Zhou MD  Date of Procedure: 11/13/2023    Post-operative Diagnosis: AF    Procedure Performed: Pulmonary vein isolation of all 4 pulmonary veins via radiofrequency ablation.     Description of Procedure: The patient was brought to the EP lab in the fasting state. Prepped and draped in sterile fashion. Safety timeout was performed. Sedation administered by anesthesia staff. Ultrasound guided venous access of the bilateral femoral veins was performed. ICE and CS catheters placed via left femoral vein access. Long sheaths via right femoral venous access. Heparin bolus and continuous infusion per protocol. Two transseptal punctures performed using combination of fluoroscopic and ICE guidance. Map created, pt found to be in atypical atrial flutter. Posterior wall isolation and mitral annular anchor line created. Persistent isolation of all 4 pulmonary veins. ICE confirmed no significant pericardial effusion.     EBL: <10 mL    Specimens: none  Complications: no immediate    Plan:  Bedrest x 6 hrs -- ends at 10 PM   ECG on arrival to recovery  Patient to resume OAC this evening. If bleeding or hematoma formation, please notify EP service before holding OAC  PPI x 1 month  Admit to observation overnight. Will be evaluated by EP in the AM.       Ney Mayfield MD  [facility]  Cardiovascular Disease, PGY-VI

## 2023-11-13 NOTE — PROGRESS NOTES
Patient admitted to recovery see [facility] for complete assessment pacu bcg's maintained safety measures verified patient instructed on pain scale and patient verbalized understanding. Also called for EKG and it was done and placed in chart. Also called patient's daughter and updated on patient location.

## 2023-11-13 NOTE — ANESTHESIA PREPROCEDURE EVALUATION
11/13/2023  Trudy Greenfield is a 83 y.o., female.  Patient Active Problem List   Diagnosis    Atypical atrial flutter    Persistent atrial fibrillation    Stenosis of right carotid artery s/p CEA    Essential hypertension    Renal artery stenosis    Coronary artery disease involving native coronary artery of native heart without angina pectoris (had prior PCI)     No past medical history on file.  No past surgical history on file.        Pre-op Assessment    I have reviewed the Patient Summary Reports.       I have reviewed the Medications.     Review of Systems  Anesthesia Hx:  No problems with previous Anesthesia               Denies Personal Hx of Anesthesia complications.                    Cardiovascular:     Hypertension   CAD                  Coronary Artery Disease:                            Hypertension     Atrial Fibrillation         Physical Exam    Airway:  No airway management difficulties anticipated  Dental:  No active dental issues noted  Chest/Lungs:  Clear to auscultation    Heart:  Rate: Normal  Rhythm: Regular Rhythm  Sounds: Normal        Anesthesia Plan  Type of Anesthesia, risks & benefits discussed:    Anesthesia Type: Gen ETT  Intra-op Monitoring Plan: Art Line  Informed Consent: Informed consent signed with the Patient and all parties understand the risks and agree with anesthesia plan.  All questions answered.   ASA Score: 3  Anesthesia Plan Notes: Chart reviewed. Patient seen and examined. Anesthesia plan discussed and questions answered. E-consent signed. Jhony Clark MD    Ready For Surgery From Anesthesia Perspective.     .

## 2023-11-13 NOTE — Clinical Note
dry, intact, no bleeding and no hematoma. Manual pressure applied for 10 mins, hemostasis achieved to bilateral groins

## 2023-11-14 VITALS
RESPIRATION RATE: 18 BRPM | SYSTOLIC BLOOD PRESSURE: 147 MMHG | DIASTOLIC BLOOD PRESSURE: 62 MMHG | HEIGHT: 68 IN | HEART RATE: 76 BPM | TEMPERATURE: 98 F | WEIGHT: 214 LBS | BODY MASS INDEX: 32.43 KG/M2 | OXYGEN SATURATION: 92 %

## 2023-11-14 PROCEDURE — 25000003 PHARM REV CODE 250: Performed by: INTERNAL MEDICINE

## 2023-11-14 RX ORDER — SUCRALFATE 1 G/1
1 TABLET ORAL
Qty: 120 TABLET | Refills: 0 | Status: SHIPPED | OUTPATIENT
Start: 2023-11-14 | End: 2023-12-14

## 2023-11-14 RX ORDER — AMIODARONE HYDROCHLORIDE 200 MG/1
400 TABLET ORAL 2 TIMES DAILY
Qty: 56 TABLET | Refills: 0 | Status: SHIPPED | OUTPATIENT
Start: 2023-11-14 | End: 2023-11-28

## 2023-11-14 RX ORDER — AMIODARONE HYDROCHLORIDE 200 MG/1
200 TABLET ORAL 2 TIMES DAILY
Qty: 90 TABLET | Refills: 3 | Status: SHIPPED | OUTPATIENT
Start: 2023-11-28 | End: 2023-12-21

## 2023-11-14 RX ORDER — PANTOPRAZOLE SODIUM 40 MG/1
40 TABLET, DELAYED RELEASE ORAL DAILY
Qty: 30 TABLET | Refills: 0 | Status: SHIPPED | OUTPATIENT
Start: 2023-11-14 | End: 2023-12-21

## 2023-11-14 RX ORDER — METOPROLOL SUCCINATE 50 MG/1
TABLET, EXTENDED RELEASE ORAL
Qty: 90 TABLET | Refills: 3 | Status: SHIPPED | OUTPATIENT
Start: 2023-11-14 | End: 2023-12-26

## 2023-11-14 RX ADMIN — ATORVASTATIN CALCIUM 20 MG: 20 TABLET, FILM COATED ORAL at 09:11

## 2023-11-14 RX ADMIN — PANTOPRAZOLE SODIUM 40 MG: 40 TABLET, DELAYED RELEASE ORAL at 09:11

## 2023-11-14 RX ADMIN — METOPROLOL SUCCINATE 50 MG: 50 TABLET, EXTENDED RELEASE ORAL at 09:11

## 2023-11-14 RX ADMIN — EZETIMIBE 10 MG: 10 TABLET ORAL at 09:11

## 2023-11-14 RX ADMIN — SUCRALFATE 1 G: 1 TABLET ORAL at 06:11

## 2023-11-14 RX ADMIN — LEVOTHYROXINE SODIUM 100 MCG: 100 TABLET ORAL at 06:11

## 2023-11-14 RX ADMIN — APIXABAN 5 MG: 5 TABLET, FILM COATED ORAL at 09:11

## 2023-11-14 NOTE — NURSING TRANSFER
Nursing Transfer Note      11/13/2023   6:42 PM    Nurse giving handoff:naseem rn   Nurse receiving handoff:cristóbal csu rn     Reason patient is being transferred: d/c criteria met     Transfer To: u 321    Transfer via stretcher    Transfer with cardiac monitoring registered box and also verified able to see patient on monitor     Transported by escort with ticket to ride     Transfer Vital Signs:  Blood Pressure:see epic   Heart Rate:see epic   O2:room air   Temperature:see epic   Respirations:see epic     Telemetry: yes  Order for Tele Monitor? Yes    Additional Lines: Pires Catheter    4eyes on Skin: yes in pacu     Medicines sent: none     Any special needs or follow-up needed: groin checks and patient needs to be pulled over to bed bilateral groin sites     Patient belongings transferred with patient: n/a     Chart send with patient: Yes    Notified: daughter     Patient reassessed at: see epic  (date, time)  1  Upon arrival to floor: to room no complaints no distress noted.

## 2023-11-14 NOTE — NURSING
1908 - Pt arrived to unit via stretcher. Slid over to bed by RN's and techs. R&L groin sites with sutures and stopcocks in place. No hematoma or active bleeding noted. VSS. Pt denies any pain.

## 2023-11-14 NOTE — PLAN OF CARE
Problem: Adult Inpatient Plan of Care  Goal: Plan of Care Review  Outcome: Met  Goal: Patient-Specific Goal (Individualized)  Outcome: Met  Goal: Absence of Hospital-Acquired Illness or Injury  Outcome: Met  Goal: Optimal Comfort and Wellbeing  Outcome: Met  Goal: Readiness for Transition of Care  Outcome: Met     Patient is in stable condition as evidenced by vital signs, neurological status and bilateral groin sites without bleeding. Patient had sufficient output after removal of saenz catheter, and ambulated without complications.

## 2023-11-14 NOTE — NURSING
1055 - IV and tele removed. Discharge paperwork reviewed and given to pt. Pt verbalized understanding. No questions or concerns.  Daughter to  medications from [facility].

## 2023-11-19 NOTE — ANESTHESIA POSTPROCEDURE EVALUATION
Anesthesia Post Evaluation    Patient: Trudy Greenfield    Procedure(s) Performed: Procedure(s) (LRB):  Ablation atrial fibrillation (N/A)  ABLATION, ARRHYTHMOGENIC FOCUS, FOR ATYPICAL ATRIAL FLUTTER (N/A)  Transesophageal echo (LEONEL) intra-procedure log documentation (N/A)    Final Anesthesia Type: general      Patient location during evaluation: PACU  Patient participation: Yes- Able to Participate  Level of consciousness: awake and alert and oriented  Post-procedure vital signs: reviewed and stable  Pain management: adequate  Airway patency: patent    PONV status at discharge: No PONV  Anesthetic complications: no      Cardiovascular status: blood pressure returned to baseline and hemodynamically stable  Respiratory status: unassisted and spontaneous ventilation  Hydration status: euvolemic  Follow-up not needed.          Vitals Value Taken Time   /62 11/14/23 0747   Temp 36.7 °C (98 °F) 11/14/23 0747   Pulse 76 11/14/23 0942   Resp 18 11/14/23 0747   SpO2 92 % 11/14/23 0747         Event Time   Out of Recovery 18:46:00         Pain/Lemuel Score: No data recorded

## 2023-11-29 DIAGNOSIS — I48.4 ATYPICAL ATRIAL FLUTTER: ICD-10-CM

## 2023-11-29 DIAGNOSIS — I48.19 PERSISTENT ATRIAL FIBRILLATION: Primary | ICD-10-CM

## 2023-12-18 ENCOUNTER — LAB VISIT (OUTPATIENT)
Dept: LAB | Facility: HOSPITAL | Age: 83
End: 2023-12-18
Attending: INTERNAL MEDICINE
Payer: MEDICARE

## 2023-12-18 DIAGNOSIS — E78.00 HYPERCHOLESTEREMIA: ICD-10-CM

## 2023-12-18 DIAGNOSIS — I10 PRIMARY HYPERTENSION: ICD-10-CM

## 2023-12-18 DIAGNOSIS — I48.19 PERSISTENT ATRIAL FIBRILLATION: ICD-10-CM

## 2023-12-18 LAB
ALBUMIN SERPL BCP-MCNC: 3.6 G/DL (ref 3.5–5.2)
ALP SERPL-CCNC: 87 U/L (ref 55–135)
ALT SERPL W/O P-5'-P-CCNC: 24 U/L (ref 10–44)
ANION GAP SERPL CALC-SCNC: 10 MMOL/L (ref 8–16)
AST SERPL-CCNC: 29 U/L (ref 10–40)
BILIRUB SERPL-MCNC: 0.7 MG/DL (ref 0.1–1)
BUN SERPL-MCNC: 30 MG/DL (ref 8–23)
CALCIUM SERPL-MCNC: 9.5 MG/DL (ref 8.7–10.5)
CHLORIDE SERPL-SCNC: 103 MMOL/L (ref 95–110)
CHOLEST SERPL-MCNC: 137 MG/DL (ref 120–199)
CHOLEST/HDLC SERPL: 2.4 {RATIO} (ref 2–5)
CO2 SERPL-SCNC: 25 MMOL/L (ref 23–29)
CREAT SERPL-MCNC: 1.4 MG/DL (ref 0.5–1.4)
EST. GFR  (NO RACE VARIABLE): 37.3 ML/MIN/1.73 M^2
GLUCOSE SERPL-MCNC: 101 MG/DL (ref 70–110)
HCT VFR BLD AUTO: 40 % (ref 37–48.5)
HDLC SERPL-MCNC: 56 MG/DL (ref 40–75)
HDLC SERPL: 40.9 % (ref 20–50)
LDLC SERPL CALC-MCNC: 67.6 MG/DL (ref 63–159)
NONHDLC SERPL-MCNC: 81 MG/DL
POTASSIUM SERPL-SCNC: 4.5 MMOL/L (ref 3.5–5.1)
PROT SERPL-MCNC: 6.8 G/DL (ref 6–8.4)
SODIUM SERPL-SCNC: 138 MMOL/L (ref 136–145)
TRIGL SERPL-MCNC: 67 MG/DL (ref 30–150)

## 2023-12-18 PROCEDURE — 85014 HEMATOCRIT: CPT | Performed by: INTERNAL MEDICINE

## 2023-12-18 PROCEDURE — 80053 COMPREHEN METABOLIC PANEL: CPT | Performed by: INTERNAL MEDICINE

## 2023-12-18 PROCEDURE — 36415 COLL VENOUS BLD VENIPUNCTURE: CPT | Mod: PO | Performed by: INTERNAL MEDICINE

## 2023-12-18 PROCEDURE — 80061 LIPID PANEL: CPT | Performed by: INTERNAL MEDICINE

## 2023-12-21 ENCOUNTER — OFFICE VISIT (OUTPATIENT)
Dept: CARDIOLOGY | Facility: CLINIC | Age: 83
End: 2023-12-21
Payer: MEDICARE

## 2023-12-21 VITALS
SYSTOLIC BLOOD PRESSURE: 132 MMHG | HEIGHT: 68 IN | DIASTOLIC BLOOD PRESSURE: 60 MMHG | BODY MASS INDEX: 33.31 KG/M2 | WEIGHT: 219.81 LBS | HEART RATE: 78 BPM

## 2023-12-21 DIAGNOSIS — I25.10 CORONARY ARTERY DISEASE INVOLVING NATIVE CORONARY ARTERY OF NATIVE HEART WITHOUT ANGINA PECTORIS: Primary | ICD-10-CM

## 2023-12-21 DIAGNOSIS — N18.32 STAGE 3B CHRONIC KIDNEY DISEASE: ICD-10-CM

## 2023-12-21 DIAGNOSIS — E03.2 HYPOTHYROIDISM DUE TO AMIODARONE: ICD-10-CM

## 2023-12-21 DIAGNOSIS — I48.19 PERSISTENT ATRIAL FIBRILLATION: ICD-10-CM

## 2023-12-21 DIAGNOSIS — E78.00 PURE HYPERCHOLESTEROLEMIA: ICD-10-CM

## 2023-12-21 DIAGNOSIS — Z79.01 ANTICOAGULATED: ICD-10-CM

## 2023-12-21 DIAGNOSIS — I65.21 STENOSIS OF RIGHT CAROTID ARTERY: ICD-10-CM

## 2023-12-21 DIAGNOSIS — I10 PRIMARY HYPERTENSION: ICD-10-CM

## 2023-12-21 DIAGNOSIS — Z95.5 S/P DRUG ELUTING CORONARY STENT PLACEMENT: ICD-10-CM

## 2023-12-21 DIAGNOSIS — T46.2X1A HYPOTHYROIDISM DUE TO AMIODARONE: ICD-10-CM

## 2023-12-21 DIAGNOSIS — I70.1 RENAL ARTERY STENOSIS: ICD-10-CM

## 2023-12-21 PROCEDURE — 1159F PR MEDICATION LIST DOCUMENTED IN MEDICAL RECORD: ICD-10-PCS | Mod: CPTII,S$GLB,, | Performed by: PHYSICIAN ASSISTANT

## 2023-12-21 PROCEDURE — 99999 PR PBB SHADOW E&M-EST. PATIENT-LVL III: CPT | Mod: PBBFAC,,, | Performed by: PHYSICIAN ASSISTANT

## 2023-12-21 PROCEDURE — 3078F PR MOST RECENT DIASTOLIC BLOOD PRESSURE < 80 MM HG: ICD-10-PCS | Mod: CPTII,S$GLB,, | Performed by: PHYSICIAN ASSISTANT

## 2023-12-21 PROCEDURE — 3078F DIAST BP <80 MM HG: CPT | Mod: CPTII,S$GLB,, | Performed by: PHYSICIAN ASSISTANT

## 2023-12-21 PROCEDURE — 1101F PR PT FALLS ASSESS DOC 0-1 FALLS W/OUT INJ PAST YR: ICD-10-PCS | Mod: CPTII,S$GLB,, | Performed by: PHYSICIAN ASSISTANT

## 2023-12-21 PROCEDURE — 99999 PR PBB SHADOW E&M-EST. PATIENT-LVL III: ICD-10-PCS | Mod: PBBFAC,,, | Performed by: PHYSICIAN ASSISTANT

## 2023-12-21 PROCEDURE — 93010 EKG 12-LEAD: ICD-10-PCS | Mod: S$GLB,,, | Performed by: INTERNAL MEDICINE

## 2023-12-21 PROCEDURE — 93005 ELECTROCARDIOGRAM TRACING: CPT | Mod: S$GLB,,, | Performed by: INTERNAL MEDICINE

## 2023-12-21 PROCEDURE — 99214 OFFICE O/P EST MOD 30 MIN: CPT | Mod: S$GLB,,, | Performed by: PHYSICIAN ASSISTANT

## 2023-12-21 PROCEDURE — 3075F SYST BP GE 130 - 139MM HG: CPT | Mod: CPTII,S$GLB,, | Performed by: PHYSICIAN ASSISTANT

## 2023-12-21 PROCEDURE — 1126F PR PAIN SEVERITY QUANTIFIED, NO PAIN PRESENT: ICD-10-PCS | Mod: CPTII,S$GLB,, | Performed by: PHYSICIAN ASSISTANT

## 2023-12-21 PROCEDURE — 1126F AMNT PAIN NOTED NONE PRSNT: CPT | Mod: CPTII,S$GLB,, | Performed by: PHYSICIAN ASSISTANT

## 2023-12-21 PROCEDURE — 3288F FALL RISK ASSESSMENT DOCD: CPT | Mod: CPTII,S$GLB,, | Performed by: PHYSICIAN ASSISTANT

## 2023-12-21 PROCEDURE — 93010 ELECTROCARDIOGRAM REPORT: CPT | Mod: S$GLB,,, | Performed by: INTERNAL MEDICINE

## 2023-12-21 PROCEDURE — 3288F PR FALLS RISK ASSESSMENT DOCUMENTED: ICD-10-PCS | Mod: CPTII,S$GLB,, | Performed by: PHYSICIAN ASSISTANT

## 2023-12-21 PROCEDURE — 1159F MED LIST DOCD IN RCRD: CPT | Mod: CPTII,S$GLB,, | Performed by: PHYSICIAN ASSISTANT

## 2023-12-21 PROCEDURE — 1101F PT FALLS ASSESS-DOCD LE1/YR: CPT | Mod: CPTII,S$GLB,, | Performed by: PHYSICIAN ASSISTANT

## 2023-12-21 PROCEDURE — 93005 EKG 12-LEAD: ICD-10-PCS | Mod: S$GLB,,, | Performed by: INTERNAL MEDICINE

## 2023-12-21 PROCEDURE — 3075F PR MOST RECENT SYSTOLIC BLOOD PRESS GE 130-139MM HG: ICD-10-PCS | Mod: CPTII,S$GLB,, | Performed by: PHYSICIAN ASSISTANT

## 2023-12-21 PROCEDURE — 99214 PR OFFICE/OUTPT VISIT, EST, LEVL IV, 30-39 MIN: ICD-10-PCS | Mod: S$GLB,,, | Performed by: PHYSICIAN ASSISTANT

## 2023-12-21 NOTE — PROGRESS NOTES
Cardiology Clinic Note  Reason for Visit: F/u afib   Initial visit with Dr. Mendoza: 9/12/2023    HPI:     Problem List and HPI:   CAD  - LAD stent 6/2013  CEA  R 2018  - follows with Dr. Carlos at [facility]  Renal artery stenosis  - s/p stent   Atrial fib  - s/p PVI with Dr. Zhou 11/2023  CKD III  Hypothyroidism      Trudy Greenfield is a 83 y.o. F, who presents for follow up.  She underwent an ablation for AFib with Dr. Zhou in November.  EKG today shows sinus rhythm with first-degree AV block.  She is scheduled to follow-up with Yi Jackman on January 30th.  She is currently taking amiodarone 200 mg b.i.d..  She has pre-existing hypothyroidism, which is recently worsened.  Condition is being managed by her primary care doctor.  She states that prior to developing AFib again in June she had been taking 2.5 mg Eliquis.  She has bilateral carotid stenosis, and plans to continue seeing Dr. Tierney to monitor this condition.  He performed her right CEA in 2018. She has no acute complaints today and denies chest pain/pressure/tightness/discomfort, dyspnea on regular exertion, orthopnea, PND, peripheral edema, sustained palpitations, syncope or claudication symptoms.       ROS:    Pertinent ROS included in HPI and below.  PMH:     Past Medical History:   Diagnosis Date    A-fib     Atrial flutter     Carotid stenosis     Coronary artery disease     Renal artery stenosis      Past Surgical History:   Procedure Laterality Date    ABLATION OF ARRHYTHMOGENIC FOCUS FOR ATRIAL FIBRILLATION N/A 11/13/2023    Procedure: Ablation atrial fibrillation;  Surgeon: Steven Zhou MD;  Location: [facility] EP LAB;  Service: Cardiology;  Laterality: N/A;  AF/AFL, LEONEL (cx if SR), PVI redo,,RFA for Atyp AFL , Carto, Gen, CO, 3prep **IODINE allergy**    ABLATION, ATRIAL FLUTTER, ATYPICAL N/A 11/13/2023    Procedure: ABLATION, ARRHYTHMOGENIC FOCUS, FOR ATYPICAL ATRIAL FLUTTER;  Surgeon: Steven Zhou MD;  Location: [facility] EP LAB;   Service: Cardiology;  Laterality: N/A;    CAROTID ENDARTERECTOMY Right     CATARACT EXTRACTION Bilateral     CORONARY ANGIOPLASTY WITH STENT PLACEMENT      ECHOCARDIOGRAM,TRANSESOPHAGEAL N/A 11/13/2023    Procedure: Transesophageal echo (LEONEL) intra-procedure log documentation;  Surgeon: Clayton Johns MD;  Location: [facility] EP LAB;  Service: Cardiology;  Laterality: N/A;    RENAL ARTERY STENT       Allergies:     Review of patient's allergies indicates:   Allergen Reactions    Iodine Other (See Comments)    Codeine Rash     Medications:     Current Outpatient Medications on File Prior to Visit   Medication Sig Dispense Refill    apixaban (ELIQUIS) 5 mg Tab Take 1 tablet (5 mg total) by mouth 2 (two) times a day. 180 tablet 3    aspirin (ECOTRIN) 81 MG EC tablet Take 81 mg by mouth every Mon, Wed, Fri.      atorvastatin (LIPITOR) 20 MG tablet Take 1 tablet (20 mg total) by mouth once daily. 90 tablet 3    chlorthalidone (HYGROTEN) 25 MG Tab Take 1 tablet (25 mg total) by mouth once daily. 90 tablet 3    ezetimibe (ZETIA) 10 mg tablet Take 1 tablet (10 mg total) by mouth once daily. 90 tablet 3    ferrous sulfate (FEOSOL) 325 mg (65 mg iron) Tab tablet Take 325 mg by mouth daily with breakfast.      fluticasone propionate (FLOVENT DISKUS) 50 mcg/actuation DsDv Inhale 2 sprays into the lungs once daily. Controller      furosemide (LASIX) 20 MG tablet Take 1 tablet (20 mg total) by mouth as needed. 30 tablet 6    levothyroxine (SYNTHROID) 100 MCG tablet Take 100 mcg by mouth before breakfast.      loratadine (CLARITIN) 10 mg tablet Take 10 mg by mouth once daily.      MAGNESIUM GLYCINATE ORAL Take 1 tablet by mouth 3 (three) times daily. 400mg total for the day      metoprolol succinate (TOPROL-XL) 50 MG 24 hr tablet Take 1 tablet by mouth daily 90 tablet 3    ubidecarenone (COENZYME Q10 ORAL) Take 1 capsule by mouth once daily.      vit A/vit C/vit E/zinc/copper (PRESERVISION AREDS ORAL) Take 1 tablet by mouth 2 (two)  "times a day.      [DISCONTINUED] amiodarone (PACERONE) 200 MG Tab Take 2 tablets (400 mg total) by mouth 2 (two) times daily. for 14 days. THEN Take 1 tablet (200 mg total) by mouth 2 (two) times daily. 90 tablet 3    [DISCONTINUED] pantoprazole (PROTONIX) 40 MG tablet Take 1 tablet (40 mg total) by mouth once daily. 30 tablet 0     No current facility-administered medications on file prior to visit.     Social History:     Social History     Tobacco Use    Smoking status: Never    Smokeless tobacco: Never   Substance Use Topics    Alcohol use: Never     Family History:   No family history on file.  Physical Exam:   /60   Pulse 78   Ht 5' 8" (1.727 m)   Wt 99.7 kg (219 lb 12.8 oz)   LMP  (LMP Unknown)   BMI 33.42 kg/m²      Physical Exam  Vitals and nursing note reviewed.   Constitutional:       Appearance: Normal appearance.   HENT:      Head: Normocephalic and atraumatic.   Neck:      Vascular: Normal carotid pulses. No carotid bruit or hepatojugular reflux.   Cardiovascular:      Rate and Rhythm: Normal rate and regular rhythm.      Chest Wall: PMI is not displaced.      Pulses:           Carotid pulses are  on the right side with bruit and  on the left side with bruit.       Radial pulses are 2+ on the right side and 2+ on the left side.        Dorsalis pedis pulses are 2+ on the right side and 2+ on the left side.        Posterior tibial pulses are 2+ on the right side and 2+ on the left side.   Pulmonary:      Effort: Pulmonary effort is normal.      Breath sounds: Normal breath sounds. No wheezing, rhonchi or rales.   Abdominal:      General: Bowel sounds are normal. There is no abdominal bruit.      Palpations: Abdomen is soft. There is no pulsatile mass.      Tenderness: There is no abdominal tenderness.   Feet:      Right foot:      Skin integrity: Skin integrity normal.      Left foot:      Skin integrity: Skin integrity normal.   Skin:     Capillary Refill: Capillary refill takes less than 2 " seconds.   Neurological:      General: No focal deficit present.      Mental Status: She is alert.   Psychiatric:         Mood and Affect: Mood and affect normal.         Speech: Speech normal.         Behavior: Behavior is cooperative.         Thought Content: Thought content normal.          Labs:     Blood Tests:  Lab Results   Component Value Date     12/18/2023    K 4.5 12/18/2023     12/18/2023    CO2 25 12/18/2023    BUN 30 (H) 12/18/2023    BUN 31.0 (H) 11/06/2023    CREATININE 1.4 12/18/2023     12/18/2023    AST 29 12/18/2023    ALT 24 12/18/2023    ALBUMIN 3.6 12/18/2023    PROT 6.8 12/18/2023    BILITOT 0.7 12/18/2023    WBC 8.0 11/06/2023    HGB 14.2 11/06/2023    HCT 40.0 12/18/2023    MCV 89.2 11/06/2023     11/06/2023    INR 1.2 11/06/2023    TSH 6.30 (H) 06/02/2022       Lab Results   Component Value Date    CHOL 137 12/18/2023    HDL 56 12/18/2023    TRIG 67 12/18/2023       Lab Results   Component Value Date    LDLCALC 67.6 12/18/2023         Imaging:     Echocardiogram  LEONEL 11/13/2023    LEONEL performed to assess LA/ERICA prior to ablation.  No evidence of intracardiac thrombus.    Left Ventricle: Normal wall motion. There is normal systolic function with a visually estimated ejection fraction of 55 - 60%.    Right Ventricle: Normal right ventricular cavity size. Systolic function is mildly reduced.    Left Atrium: Left atrium is dilated. The left atrial appendage appears normal. The left atrial appendage has a cauliflower morphology. Appendage velocity is reduced at less than 40 cm/sec. There is no thrombus in the left atrial appendage.    Right Atrium: Right atrium is dilated.    Mitral Valve: There is mild regurgitation.    Tricuspid Valve: There is mild regurgitation.       Stress testing  None    Cath Lab  None    Other  Carotid US 8/17/2023  COMMENTS:   Right:  50-69% internal carotid artery stenosis     Left:  50-69% internal carotid artery stenosis     Assessment:      1. Coronary artery disease involving native coronary artery of native heart without angina pectoris (had prior PCI)    2. S/P drug eluting coronary stent placement    3. Persistent atrial fibrillation    4. Anticoagulated    5. Hypothyroidism due to amiodarone    6. Stenosis of right carotid artery s/p CEA    7. Renal artery stenosis    8. Primary hypertension    9. Stage 3b chronic kidney disease    10. Pure hypercholesterolemia        Plan:     Coronary artery disease involving native coronary artery of native heart without angina pectoris (had prior PCI)  S/P drug eluting coronary stent placement  Continue aspirin every other day and atorvastatin 20 mg q.d.    Persistent atrial fibrillation  Anticoagulated  Hypothyroidism due to amiodarone  Has follow-up scheduled with EP January 30th and will discuss appropriate duration of amiodarone at that time.  Continue Eliquis 5 mg b.i.d. and metoprolol succinate 50 mg q.d.    Stenosis of right carotid artery s/p CEA  Renal artery stenosis  Plans to continue following with vascular surgery at [facility]    Primary hypertension  Stable, continue current medications  Recommend low-sodium diet and monitoring at home    Stage 3b chronic kidney disease  Follows with Nephrology    Pure hypercholesterolemia  Last fasting lipid panel from December 18, 2023 shows LDL well-controlled at 67.6.    Continue atorvastatin 20 mg q.d.      Signed:  Dionna Haynes PA-C  Cardiology     12/21/2023 12:33 PM    Follow-up:     Future Appointments   Date Time Provider Department Center   1/30/2024 10:30 AM EKG, APPT [facility] E[facility]   1/30/2024 11:00 AM Yi Jackman, NP [facility] AR[facility]HYTH [facility]

## 2023-12-22 DIAGNOSIS — I48.19 PERSISTENT ATRIAL FIBRILLATION: ICD-10-CM

## 2023-12-26 RX ORDER — METOPROLOL SUCCINATE 50 MG/1
TABLET, EXTENDED RELEASE ORAL
Qty: 90 TABLET | Refills: 3 | OUTPATIENT
Start: 2023-12-26 | End: 2024-02-02 | Stop reason: SDUPTHER

## 2024-01-11 RX ORDER — AMIODARONE HYDROCHLORIDE 200 MG/1
200 TABLET ORAL DAILY
Qty: 180 TABLET | Refills: 0 | Status: SHIPPED | OUTPATIENT
Start: 2024-01-11 | End: 2024-02-02

## 2024-01-29 ENCOUNTER — PATIENT MESSAGE (OUTPATIENT)
Dept: ELECTROPHYSIOLOGY | Facility: CLINIC | Age: 84
End: 2024-01-29
Payer: MEDICARE

## 2024-01-31 PROBLEM — Z79.01 ON ANTICOAGULANT THERAPY: Status: ACTIVE | Noted: 2024-01-31

## 2024-01-31 NOTE — PROGRESS NOTES
Ms. Greenfield is a patient of Dr. Zhou and was last seen in clinic 8/29/2023.      Subjective:   Patient ID:  Trudy Greenfield is an 83 y.o. female who presents for follow up of Atrial Fibrillation  .     HPI:    Ms. Greenfield is an 83 y.o. female with CAD (PCI), HTN, CKD, AF (cryo PVI 2021; PWI, atypical AFL RFA 2023) here for follow up after ablation.    Background:    Referring Electrophysiologist: Justin Nelson MD    Mrs. Greenfield has a hx of coronary artery disease s/p PCI, hypertension, CKD stage III, and persistent AF s/p cryoablation in 2021 with subsequent recurrence. Her recurrences have been of persistent AF/atypical atrial flutter. She had repeat cardioversions and placed on amiodarone which she has failed. She feels miserable when not in sinus rhythm. Dr. Nelson did not recommend a repeat ablation and recommend PPM/AVN ablation. She did not desire this unless there was no other option. She is requesting consideration for redo-ablation. At her initial procedure she underwent PVI with cryo and developed atrial flutter. She had a LA roof line and what sounds like anterior mitral isthmus block. She than had CTI ablation with an RF catheter.     In-clinic ECG is atypical atrial flutter with variable AV block  In summary, Mrs. Greenfield is a pleasant 82 year-old woman with coronary artery disease s/p PCI, hypertension, CKD stage III, and persistent AF s/p cryoablation in 2021 with subsequent recurrence despite amiodarone. She is in an atypical atrial flutter. She had a CTI ablation and LA roof line and (?)anterior mitral isthmus(?) ablation with cryo. She declines PPM/AVN currently. I offered redo-ablation. I spent about a half hour discussing the nature of PVI including transseptal puncture. We discussed risks and benefits at length. Our discussion included, but was not limited to the risk of death, infection, bleeding, stroke, MI, cardiac perforation, embolism, cardiac tamponade, vascular injury, valvular  injury, AE fistula, injury to phrenic nerve, pulmonary vein stenosis and other organic injury including the possibility for need for surgery or pacemaker implantation.  She understood and desires to proceed. All questions answered.  Plan: Redo-PVI, atypical atrial flutter ablation    Update (02/02/2024):    11/13/2023:    Durable entrance/exit block of all 4 pulmonary veins and durable bidirectional CTI block from prior ablation    Succesful mapping and ablation of the presenting atypical left atrial roof flutter by creating a roof line    Successful mapping and ablation of counterclockwise mitral annular flutter, induced by burst pacing, treated with creation/reinforcement of an anterior mitral isthmus line    Successful left atrial posterior wall isolation targeting CFEs as potential triggers for atrial fibrillation    Today she says she has felt well since procedure, with no known AF recurrence. No worsening PEARSON, CP, LH, syncope reported. Some fatigue.    She is currently taking eliquis 5mg BID for stroke prophylaxis and denies significant bleeding episodes. She is currently being treated with amiodarone 200mg BID for rhythm control and toprol 50mg daily for HR control.  Kidney function is stable, with a creatinine of 1.4 on 12/18/2023. LFTs ok 12/2023. No recent TSH.    I have personally reviewed the patient's EKG today, which shows sinus sylwia with 1st deg AVB at 56bpm. TX interval is 224. QRS is 90. QT is 504.    Relevant Cardiac Test Results:    LEONEL(11/13/2023):    LEONEL performed to assess LA/ERICA prior to ablation.  No evidence of intracardiac thrombus.    Left Ventricle: Normal wall motion. There is normal systolic function with a visually estimated ejection fraction of 55 - 60%.    Right Ventricle: Normal right ventricular cavity size. Systolic function is mildly reduced.    Left Atrium: Left atrium is dilated. The left atrial appendage appears normal. The left atrial appendage has a cauliflower morphology.  Appendage velocity is reduced at less than 40 cm/sec. There is no thrombus in the left atrial appendage.    Right Atrium: Right atrium is dilated.    Mitral Valve: There is mild regurgitation.    Tricuspid Valve: There is mild regurgitation.    Current Outpatient Medications   Medication Sig    amiodarone (PACERONE) 200 MG Tab Take 1 tablet (200 mg total) by mouth once daily.    apixaban (ELIQUIS) 5 mg Tab Take 1 tablet (5 mg total) by mouth 2 (two) times a day.    aspirin (ECOTRIN) 81 MG EC tablet Take 81 mg by mouth every Mon, Wed, Fri.    atorvastatin (LIPITOR) 20 MG tablet Take 1 tablet (20 mg total) by mouth once daily.    chlorthalidone (HYGROTEN) 25 MG Tab Take 1 tablet (25 mg total) by mouth once daily.    ezetimibe (ZETIA) 10 mg tablet Take 1 tablet (10 mg total) by mouth once daily.    ferrous sulfate (FEOSOL) 325 mg (65 mg iron) Tab tablet Take 325 mg by mouth daily with breakfast.    fluticasone propionate (FLOVENT DISKUS) 50 mcg/actuation DsDv Inhale 2 sprays into the lungs once daily. Controller    furosemide (LASIX) 20 MG tablet Take 1 tablet (20 mg total) by mouth as needed.    levothyroxine (SYNTHROID) 100 MCG tablet Take 100 mcg by mouth before breakfast.    loratadine (CLARITIN) 10 mg tablet Take 10 mg by mouth once daily.    MAGNESIUM GLYCINATE ORAL Take 1 tablet by mouth 3 (three) times daily. 400mg total for the day    metoprolol succinate (TOPROL-XL) 50 MG 24 hr tablet     ubidecarenone (COENZYME Q10 ORAL) Take 1 capsule by mouth once daily.    vit A/vit C/vit E/zinc/copper (PRESERVISION AREDS ORAL) Take 1 tablet by mouth 2 (two) times a day.     No current facility-administered medications for this visit.       Review of Systems   Constitutional: Positive for malaise/fatigue.   Cardiovascular:  Negative for chest pain, dyspnea on exertion, irregular heartbeat, leg swelling and palpitations.   Respiratory:  Negative for shortness of breath.    Hematologic/Lymphatic: Negative for bleeding  "problem.   Skin:  Negative for rash.   Musculoskeletal:  Negative for myalgias.   Gastrointestinal:  Negative for hematemesis, hematochezia and nausea.   Genitourinary:  Negative for hematuria.   Neurological:  Negative for light-headedness.   Psychiatric/Behavioral:  Negative for altered mental status.    Allergic/Immunologic: Negative for persistent infections.       Objective:          /74   Pulse (!) 56   Ht 5' 8" (1.727 m)   Wt 96.6 kg (213 lb)   LMP  (LMP Unknown)   BMI 32.39 kg/m²     Physical Exam  Vitals and nursing note reviewed.   Constitutional:       Appearance: Normal appearance. She is well-developed.   HENT:      Head: Normocephalic.      Nose: Nose normal.   Eyes:      Pupils: Pupils are equal, round, and reactive to light.   Cardiovascular:      Rate and Rhythm: Regular rhythm. Bradycardia present.   Pulmonary:      Effort: No respiratory distress.      Breath sounds: Normal breath sounds.   Musculoskeletal:         General: Normal range of motion.   Skin:     General: Skin is warm and dry.      Findings: No erythema.   Neurological:      Mental Status: She is alert and oriented to person, place, and time.   Psychiatric:         Speech: Speech normal.         Behavior: Behavior normal.       Lab Results   Component Value Date     12/18/2023    K 4.5 12/18/2023    BUN 30 (H) 12/18/2023    BUN 31.0 (H) 11/06/2023    CREATININE 1.4 12/18/2023    ALT 24 12/18/2023    AST 29 12/18/2023    HGB 14.2 11/06/2023    HCT 40.0 12/18/2023    TSH 6.30 (H) 06/02/2022    LDLCALC 67.6 12/18/2023       Recent Labs   Lab 10/25/22  0940 02/03/23  0556 11/06/23  0953   INR 1.2 1.2 1.2       Assessment:     1. Persistent atrial fibrillation    2. Essential hypertension    3. Atypical atrial flutter    4. On anticoagulant therapy    5. On amiodarone therapy        Plan:     In summary, Ms. Greenfield is an 83 y.o. female with CAD (PCI), HTN, CKD, AF (cryo PVI 2021; PWI, atypical AFL RFA 2023) here for " follow up after ablation.  She is 2.5 mo s/p redo ablation (PWI, left atrial roof flutter, mitral annular flutter). She is doing well from a rhythm standpoint, with no documented or symptomatic recurrence of arrhythmia since procedure. LEONEL 11/2023 EF 55-60%.  She is still taking amiodarone 200mg BID. Will wean off now. CHADSVASc 5 on eliquis.    Reduce amiodarone to 200mg once daily for 6 weeks, then reduce to 100mg once daily. Plan to stop at next clinic visit if no AF.  Continue other meds  RTC 3 mo, sooner if needed    *A copy of this note has been sent to Dr. Zhou*    Follow up in about 3 months (around 5/2/2024).    ------------------------------------------------------------------    NNEKA Morin, NP-C  Cardiac Electrophysiology

## 2024-02-02 ENCOUNTER — HOSPITAL ENCOUNTER (OUTPATIENT)
Dept: CARDIOLOGY | Facility: CLINIC | Age: 84
Discharge: HOME | End: 2024-02-02
Payer: MEDICARE

## 2024-02-02 ENCOUNTER — OFFICE VISIT (OUTPATIENT)
Dept: ELECTROPHYSIOLOGY | Facility: CLINIC | Age: 84
End: 2024-02-02
Payer: MEDICARE

## 2024-02-02 VITALS
DIASTOLIC BLOOD PRESSURE: 74 MMHG | SYSTOLIC BLOOD PRESSURE: 122 MMHG | WEIGHT: 213 LBS | BODY MASS INDEX: 32.28 KG/M2 | HEIGHT: 68 IN | HEART RATE: 56 BPM

## 2024-02-02 DIAGNOSIS — Z79.899 ON AMIODARONE THERAPY: ICD-10-CM

## 2024-02-02 DIAGNOSIS — Z79.01 ON ANTICOAGULANT THERAPY: ICD-10-CM

## 2024-02-02 DIAGNOSIS — I48.19 PERSISTENT ATRIAL FIBRILLATION: Primary | ICD-10-CM

## 2024-02-02 DIAGNOSIS — I48.4 ATYPICAL ATRIAL FLUTTER: ICD-10-CM

## 2024-02-02 DIAGNOSIS — I48.19 PERSISTENT ATRIAL FIBRILLATION: ICD-10-CM

## 2024-02-02 DIAGNOSIS — I10 ESSENTIAL HYPERTENSION: ICD-10-CM

## 2024-02-02 PROCEDURE — 93005 ELECTROCARDIOGRAM TRACING: CPT | Mod: S$GLB,,, | Performed by: INTERNAL MEDICINE

## 2024-02-02 PROCEDURE — 93010 ELECTROCARDIOGRAM REPORT: CPT | Mod: S$GLB,,, | Performed by: INTERNAL MEDICINE

## 2024-02-02 PROCEDURE — 1126F AMNT PAIN NOTED NONE PRSNT: CPT | Mod: CPTII,S$GLB,, | Performed by: NURSE PRACTITIONER

## 2024-02-02 PROCEDURE — 1101F PT FALLS ASSESS-DOCD LE1/YR: CPT | Mod: CPTII,S$GLB,, | Performed by: NURSE PRACTITIONER

## 2024-02-02 PROCEDURE — 99999 PR PBB SHADOW E&M-EST. PATIENT-LVL IV: CPT | Mod: PBBFAC,,, | Performed by: NURSE PRACTITIONER

## 2024-02-02 PROCEDURE — 99214 OFFICE O/P EST MOD 30 MIN: CPT | Mod: S$GLB,,, | Performed by: NURSE PRACTITIONER

## 2024-02-02 PROCEDURE — 3078F DIAST BP <80 MM HG: CPT | Mod: CPTII,S$GLB,, | Performed by: NURSE PRACTITIONER

## 2024-02-02 PROCEDURE — 1159F MED LIST DOCD IN RCRD: CPT | Mod: CPTII,S$GLB,, | Performed by: NURSE PRACTITIONER

## 2024-02-02 PROCEDURE — 3288F FALL RISK ASSESSMENT DOCD: CPT | Mod: CPTII,S$GLB,, | Performed by: NURSE PRACTITIONER

## 2024-02-02 PROCEDURE — 3074F SYST BP LT 130 MM HG: CPT | Mod: CPTII,S$GLB,, | Performed by: NURSE PRACTITIONER

## 2024-02-02 RX ORDER — LEVOTHYROXINE SODIUM 75 UG/1
75 TABLET ORAL
COMMUNITY
Start: 2023-12-22

## 2024-02-02 RX ORDER — AMIODARONE HYDROCHLORIDE 200 MG/1
TABLET ORAL
Qty: 180 TABLET | Refills: 0 | Status: SHIPPED | OUTPATIENT
Start: 2024-02-02 | End: 2024-05-07

## 2024-02-02 RX ORDER — METOPROLOL SUCCINATE 50 MG/1
50 TABLET, EXTENDED RELEASE ORAL DAILY
Qty: 90 TABLET | Refills: 3 | Status: SHIPPED | OUTPATIENT
Start: 2024-02-02

## 2024-02-02 NOTE — PATIENT INSTRUCTIONS
Reduce amiodarone to 1 tablet (200mg) once daily until March 15, then reduce to 1/2 tablet (100mg) once daily

## 2024-05-03 PROBLEM — I13.0 HYPERTENSIVE HEART AND CHRONIC KIDNEY DISEASE WITH HEART FAILURE AND STAGE 1 THROUGH STAGE 4 CHRONIC KIDNEY DISEASE, OR UNSPECIFIED CHRONIC KIDNEY DISEASE: Status: ACTIVE | Noted: 2024-05-03

## 2024-05-03 NOTE — PROGRESS NOTES
Ms. Greenfield is a patient of Dr. Zhou and was last seen in clinic 2/2/2024.      Subjective:   Patient ID:  Trudy Greenfield is an 83 y.o. female who presents for follow up of Atrial Fibrillation  .     HPI:    Ms. Greenfield is an 83 y.o. female with CAD (PCI), HTN, CKD, AF (cryo PVI 2021; PWI, atypical AFL RFA 2023) here for follow up.    Background:    Referring Electrophysiologist: Justin Nelson MD    Mrs. Greenfield has a hx of coronary artery disease s/p PCI, hypertension, CKD stage III, and persistent AF s/p cryoablation in 2021 with subsequent recurrence. Her recurrences have been of persistent AF/atypical atrial flutter. She had repeat cardioversions and placed on amiodarone which she has failed. She feels miserable when not in sinus rhythm. Dr. Nelson did not recommend a repeat ablation and recommend PPM/AVN ablation. She did not desire this unless there was no other option. She is requesting consideration for redo-ablation. At her initial procedure she underwent PVI with cryo and developed atrial flutter. She had a LA roof line and what sounds like anterior mitral isthmus block. She than had CTI ablation with an RF catheter.     In-clinic ECG is atypical atrial flutter with variable AV block  In summary, Mrs. Greenfield is a pleasant 82 year-old woman with coronary artery disease s/p PCI, hypertension, CKD stage III, and persistent AF s/p cryoablation in 2021 with subsequent recurrence despite amiodarone. She is in an atypical atrial flutter. She had a CTI ablation and LA roof line and (?)anterior mitral isthmus(?) ablation with cryo. She declines PPM/AVN currently. I offered redo-ablation. I spent about a half hour discussing the nature of PVI including transseptal puncture. We discussed risks and benefits at length. Our discussion included, but was not limited to the risk of death, infection, bleeding, stroke, MI, cardiac perforation, embolism, cardiac tamponade, vascular injury, valvular injury, AE fistula,  injury to phrenic nerve, pulmonary vein stenosis and other organic injury including the possibility for need for surgery or pacemaker implantation.  She understood and desires to proceed. All questions answered.  Plan: Redo-PVI, atypical atrial flutter ablation    11/13/2023:    Durable entrance/exit block of all 4 pulmonary veins and durable bidirectional CTI block from prior ablation    Succesful mapping and ablation of the presenting atypical left atrial roof flutter by creating a roof line    Successful mapping and ablation of counterclockwise mitral annular flutter, induced by burst pacing, treated with creation/reinforcement of an anterior mitral isthmus line    Successful left atrial posterior wall isolation targeting CFEs as potential triggers for atrial fibrillation    2/2/2024: She is 2.5 mo s/p redo ablation (PWI, left atrial roof flutter, mitral annular flutter). She is doing well from a rhythm standpoint, with no documented or symptomatic recurrence of arrhythmia since procedure. LEONEL 11/2023 EF 55-60%.  She is still taking amiodarone 200mg BID. Will wean off now. CHADSVASc 5 on eliquis.  Reduce amiodarone to 200mg once daily for 6 weeks, then reduce to 100mg once daily. Plan to stop at next clinic visit if no AF.    Update (05/07/2024):    Today she says she has not experienced any palpitations or other AF symptoms. No worsening PEARSON, CP, LH, syncope reported. Chronic fatigue.     She is currently taking eliquis 5mg BID for stroke prophylaxis and denies significant bleeding episodes. She is currently being treated with amiodarone 100mg daily for rhythm control and toprol 50mg daily for HR control.  Kidney function is low for pt, with a creatinine of 1.7 on 5/4/2024.    I have personally reviewed the patient's EKG today, which shows sinus rhythm with 1st deg AVB at 59bpm. NJ interval is 216. QRS is 94. QT is 448.    Relevant Cardiac Test Results:    LEONEL (11/13/2023):    LEONEL performed to assess LA/ERICA  prior to ablation.  No evidence of intracardiac thrombus.    Left Ventricle: Normal wall motion. There is normal systolic function with a visually estimated ejection fraction of 55 - 60%.    Right Ventricle: Normal right ventricular cavity size. Systolic function is mildly reduced.    Left Atrium: Left atrium is dilated. The left atrial appendage appears normal. The left atrial appendage has a cauliflower morphology. Appendage velocity is reduced at less than 40 cm/sec. There is no thrombus in the left atrial appendage.    Right Atrium: Right atrium is dilated.    Mitral Valve: There is mild regurgitation.    Tricuspid Valve: There is mild regurgitation.    Current Outpatient Medications   Medication Sig Dispense Refill    amiodarone (PACERONE) 200 MG Tab Take 1 tablet once daily for 6 weeks (March 15), then reduce to 1/2 tablet daily. 180 tablet 0    apixaban (ELIQUIS) 5 mg Tab Take 1 tablet (5 mg total) by mouth 2 (two) times a day. 180 tablet 3    aspirin (ECOTRIN) 81 MG EC tablet Take 81 mg by mouth every Mon, Wed, Fri.      atorvastatin (LIPITOR) 20 MG tablet Take 1 tablet (20 mg total) by mouth once daily. 90 tablet 3    chlorthalidone (HYGROTEN) 25 MG Tab Take 1 tablet (25 mg total) by mouth once daily. 90 tablet 3    ezetimibe (ZETIA) 10 mg tablet Take 1 tablet (10 mg total) by mouth once daily. 90 tablet 3    ferrous sulfate (FEOSOL) 325 mg (65 mg iron) Tab tablet Take 325 mg by mouth daily with breakfast.      fluticasone propionate (FLOVENT DISKUS) 50 mcg/actuation DsDv Inhale 2 sprays into the lungs once daily. Controller      furosemide (LASIX) 20 MG tablet Take 1 tablet (20 mg total) by mouth as needed. 30 tablet 6    levothyroxine (SYNTHROID) 100 MCG tablet Take 100 mcg by mouth before breakfast. 0.75mcg once a day      levothyroxine (SYNTHROID) 75 MCG tablet Take 75 mcg by mouth.      loratadine (CLARITIN) 10 mg tablet Take 10 mg by mouth once daily.      MAGNESIUM GLYCINATE ORAL Take 1 tablet by  "mouth 3 (three) times daily. 400mg total for the day      metoprolol succinate (TOPROL-XL) 50 MG 24 hr tablet Take 1 tablet (50 mg total) by mouth once daily. 90 tablet 3    ubidecarenone (COENZYME Q10 ORAL) Take 1 capsule by mouth once daily.      vit A/vit C/vit E/zinc/copper (PRESERVISION AREDS ORAL) Take 1 tablet by mouth 2 (two) times a day.       No current facility-administered medications for this visit.       Review of Systems   Constitutional: Positive for malaise/fatigue.   Cardiovascular:  Negative for chest pain, dyspnea on exertion, irregular heartbeat, leg swelling and palpitations.   Respiratory:  Negative for shortness of breath.    Hematologic/Lymphatic: Negative for bleeding problem. Bruises/bleeds easily.   Skin:  Negative for rash.   Musculoskeletal:  Negative for myalgias.   Gastrointestinal:  Negative for hematemesis, hematochezia and nausea.   Genitourinary:  Negative for hematuria.   Neurological:  Negative for light-headedness.   Psychiatric/Behavioral:  Negative for altered mental status.    Allergic/Immunologic: Negative for persistent infections.       Objective:          BP (!) 142/72   Pulse (!) 59   Ht 5' 8" (1.727 m)   Wt 98.3 kg (216 lb 11.4 oz)   LMP  (LMP Unknown)   BMI 32.95 kg/m²     Physical Exam  Vitals and nursing note reviewed.   Constitutional:       Appearance: Normal appearance. She is well-developed.   HENT:      Head: Normocephalic.      Nose: Nose normal.   Eyes:      Pupils: Pupils are equal, round, and reactive to light.   Cardiovascular:      Rate and Rhythm: Normal rate and regular rhythm.   Pulmonary:      Effort: No respiratory distress.      Breath sounds: Normal breath sounds.   Musculoskeletal:         General: Normal range of motion.   Skin:     General: Skin is warm and dry.      Findings: No erythema.   Neurological:      Mental Status: She is alert and oriented to person, place, and time.   Psychiatric:         Speech: Speech normal.         Behavior: " Behavior normal.           Lab Results   Component Value Date     12/18/2023    K 4.5 12/18/2023    BUN 30 (H) 12/18/2023    BUN 31.0 (H) 11/06/2023    CREATININE 1.4 12/18/2023    ALT 24 12/18/2023    AST 29 12/18/2023    HGB 14.2 11/06/2023    HCT 40.0 12/18/2023    TSH 6.30 (H) 06/02/2022    LDLCALC 67.6 12/18/2023       Recent Labs   Lab 10/25/22  0940 02/03/23  0556 11/06/23  0953   INR 1.2 1.2 1.2       Assessment:     1. Persistent atrial fibrillation    2. Hypertensive heart and chronic kidney disease with heart failure and stage 1 through stage 4 chronic kidney disease, or unspecified chronic kidney disease    3. Renal artery stenosis    4. Atypical atrial flutter    5. Essential hypertension    6. On anticoagulant therapy        Plan:     In summary, Ms. Greenfield is an 83 y.o. female with CAD (PCI), HTN, CKD, AF (cryo PVI 2021; PWI, atypical AFL RFA 2023) here for follow up.  She is now 6 mo s/p PVI/RFA of atypical AFL. She continues to do well from a rhythm standpoint, with no documented or symptomatic recurrence of arrhythmia since procedure.  Remains on amiodarone 100mg daily. Will stop amio now. CHADSVASc 5 on eliquis. Creatinine is 1.7 but this is not her baseline. Will continue to monitor trend - if she does not return to baseline will reduce dose to 2.5mg BID.    Stop amiodarone  Continue other meds  RTC 6 mo, sooner if needed      *A copy of this note has been sent to Dr. Zhou*    Follow up in about 6 months (around 11/7/2024).    ------------------------------------------------------------------    Yi Jackman, NNEKA, NP-C  Cardiac Electrophysiology

## 2024-05-07 ENCOUNTER — OFFICE VISIT (OUTPATIENT)
Dept: ELECTROPHYSIOLOGY | Facility: CLINIC | Age: 84
End: 2024-05-07
Payer: MEDICARE

## 2024-05-07 ENCOUNTER — HOSPITAL ENCOUNTER (OUTPATIENT)
Dept: CARDIOLOGY | Facility: CLINIC | Age: 84
Discharge: HOME | End: 2024-05-07
Payer: MEDICARE

## 2024-05-07 VITALS
BODY MASS INDEX: 32.84 KG/M2 | WEIGHT: 216.69 LBS | HEART RATE: 59 BPM | HEIGHT: 68 IN | SYSTOLIC BLOOD PRESSURE: 142 MMHG | DIASTOLIC BLOOD PRESSURE: 72 MMHG

## 2024-05-07 DIAGNOSIS — Z79.01 ON ANTICOAGULANT THERAPY: ICD-10-CM

## 2024-05-07 DIAGNOSIS — I13.0 HYPERTENSIVE HEART AND CHRONIC KIDNEY DISEASE WITH HEART FAILURE AND STAGE 1 THROUGH STAGE 4 CHRONIC KIDNEY DISEASE, OR UNSPECIFIED CHRONIC KIDNEY DISEASE: ICD-10-CM

## 2024-05-07 DIAGNOSIS — I48.4 ATYPICAL ATRIAL FLUTTER: ICD-10-CM

## 2024-05-07 DIAGNOSIS — I10 ESSENTIAL HYPERTENSION: ICD-10-CM

## 2024-05-07 DIAGNOSIS — I48.19 PERSISTENT ATRIAL FIBRILLATION: ICD-10-CM

## 2024-05-07 DIAGNOSIS — I70.1 RENAL ARTERY STENOSIS: ICD-10-CM

## 2024-05-07 DIAGNOSIS — I48.19 PERSISTENT ATRIAL FIBRILLATION: Primary | ICD-10-CM

## 2024-05-07 LAB
OHS QRS DURATION: 94 MS
OHS QTC CALCULATION: 443 MS

## 2024-05-07 PROCEDURE — 1101F PT FALLS ASSESS-DOCD LE1/YR: CPT | Mod: CPTII,S$GLB,, | Performed by: NURSE PRACTITIONER

## 2024-05-07 PROCEDURE — 1159F MED LIST DOCD IN RCRD: CPT | Mod: CPTII,S$GLB,, | Performed by: NURSE PRACTITIONER

## 2024-05-07 PROCEDURE — 99214 OFFICE O/P EST MOD 30 MIN: CPT | Mod: S$GLB,,, | Performed by: NURSE PRACTITIONER

## 2024-05-07 PROCEDURE — 1160F RVW MEDS BY RX/DR IN RCRD: CPT | Mod: CPTII,S$GLB,, | Performed by: NURSE PRACTITIONER

## 2024-05-07 PROCEDURE — 99999 PR PBB SHADOW E&M-EST. PATIENT-LVL III: CPT | Mod: PBBFAC,,, | Performed by: NURSE PRACTITIONER

## 2024-05-07 PROCEDURE — 1126F AMNT PAIN NOTED NONE PRSNT: CPT | Mod: CPTII,S$GLB,, | Performed by: NURSE PRACTITIONER

## 2024-05-07 PROCEDURE — 3288F FALL RISK ASSESSMENT DOCD: CPT | Mod: CPTII,S$GLB,, | Performed by: NURSE PRACTITIONER

## 2024-05-07 PROCEDURE — 93005 ELECTROCARDIOGRAM TRACING: CPT | Mod: S$GLB,,, | Performed by: INTERNAL MEDICINE

## 2024-05-07 PROCEDURE — 3077F SYST BP >= 140 MM HG: CPT | Mod: CPTII,S$GLB,, | Performed by: NURSE PRACTITIONER

## 2024-05-07 PROCEDURE — 93010 ELECTROCARDIOGRAM REPORT: CPT | Mod: S$GLB,,, | Performed by: INTERNAL MEDICINE

## 2024-05-07 PROCEDURE — 3078F DIAST BP <80 MM HG: CPT | Mod: CPTII,S$GLB,, | Performed by: NURSE PRACTITIONER

## 2024-08-06 DIAGNOSIS — Z79.01 ON ANTICOAGULANT THERAPY: Primary | ICD-10-CM

## 2024-08-13 ENCOUNTER — OFFICE VISIT (OUTPATIENT)
Dept: CARDIOLOGY | Facility: CLINIC | Age: 84
End: 2024-08-13
Payer: MEDICARE

## 2024-08-13 ENCOUNTER — LAB VISIT (OUTPATIENT)
Dept: LAB | Facility: HOSPITAL | Age: 84
End: 2024-08-13
Payer: MEDICARE

## 2024-08-13 VITALS
HEIGHT: 68 IN | WEIGHT: 214.19 LBS | SYSTOLIC BLOOD PRESSURE: 156 MMHG | BODY MASS INDEX: 32.46 KG/M2 | DIASTOLIC BLOOD PRESSURE: 82 MMHG | HEART RATE: 61 BPM

## 2024-08-13 DIAGNOSIS — E03.2 HYPOTHYROIDISM DUE TO AMIODARONE: ICD-10-CM

## 2024-08-13 DIAGNOSIS — I25.10 CORONARY ARTERY DISEASE INVOLVING NATIVE CORONARY ARTERY OF NATIVE HEART WITHOUT ANGINA PECTORIS: Primary | ICD-10-CM

## 2024-08-13 DIAGNOSIS — E78.00 PURE HYPERCHOLESTEROLEMIA: ICD-10-CM

## 2024-08-13 DIAGNOSIS — Z79.01 ON ANTICOAGULANT THERAPY: ICD-10-CM

## 2024-08-13 DIAGNOSIS — I65.21 STENOSIS OF RIGHT CAROTID ARTERY: ICD-10-CM

## 2024-08-13 DIAGNOSIS — I10 PRIMARY HYPERTENSION: ICD-10-CM

## 2024-08-13 DIAGNOSIS — I70.1 RENAL ARTERY STENOSIS: ICD-10-CM

## 2024-08-13 DIAGNOSIS — T46.2X1A HYPOTHYROIDISM DUE TO AMIODARONE: ICD-10-CM

## 2024-08-13 DIAGNOSIS — N18.32 STAGE 3B CHRONIC KIDNEY DISEASE: ICD-10-CM

## 2024-08-13 DIAGNOSIS — I48.19 PERSISTENT ATRIAL FIBRILLATION: ICD-10-CM

## 2024-08-13 DIAGNOSIS — Z79.01 ANTICOAGULATED: ICD-10-CM

## 2024-08-13 DIAGNOSIS — Z95.5 S/P DRUG ELUTING CORONARY STENT PLACEMENT: ICD-10-CM

## 2024-08-13 LAB
ANION GAP SERPL CALC-SCNC: 9 MMOL/L (ref 8–16)
BUN SERPL-MCNC: 28 MG/DL (ref 8–23)
CALCIUM SERPL-MCNC: 9.4 MG/DL (ref 8.7–10.5)
CHLORIDE SERPL-SCNC: 104 MMOL/L (ref 95–110)
CO2 SERPL-SCNC: 26 MMOL/L (ref 23–29)
CREAT SERPL-MCNC: 1.3 MG/DL (ref 0.5–1.4)
EST. GFR  (NO RACE VARIABLE): 40.8 ML/MIN/1.73 M^2
GLUCOSE SERPL-MCNC: 94 MG/DL (ref 70–110)
POTASSIUM SERPL-SCNC: 4.3 MMOL/L (ref 3.5–5.1)
SODIUM SERPL-SCNC: 139 MMOL/L (ref 136–145)

## 2024-08-13 PROCEDURE — 99214 OFFICE O/P EST MOD 30 MIN: CPT | Mod: S$GLB,,, | Performed by: PHYSICIAN ASSISTANT

## 2024-08-13 PROCEDURE — 1101F PT FALLS ASSESS-DOCD LE1/YR: CPT | Mod: CPTII,S$GLB,, | Performed by: PHYSICIAN ASSISTANT

## 2024-08-13 PROCEDURE — 3288F FALL RISK ASSESSMENT DOCD: CPT | Mod: CPTII,S$GLB,, | Performed by: PHYSICIAN ASSISTANT

## 2024-08-13 PROCEDURE — 1126F AMNT PAIN NOTED NONE PRSNT: CPT | Mod: CPTII,S$GLB,, | Performed by: PHYSICIAN ASSISTANT

## 2024-08-13 PROCEDURE — 36415 COLL VENOUS BLD VENIPUNCTURE: CPT | Mod: PO | Performed by: NURSE PRACTITIONER

## 2024-08-13 PROCEDURE — 1159F MED LIST DOCD IN RCRD: CPT | Mod: CPTII,S$GLB,, | Performed by: PHYSICIAN ASSISTANT

## 2024-08-13 PROCEDURE — 80048 BASIC METABOLIC PNL TOTAL CA: CPT | Performed by: NURSE PRACTITIONER

## 2024-08-13 PROCEDURE — 3079F DIAST BP 80-89 MM HG: CPT | Mod: CPTII,S$GLB,, | Performed by: PHYSICIAN ASSISTANT

## 2024-08-13 PROCEDURE — 99999 PR PBB SHADOW E&M-EST. PATIENT-LVL IV: CPT | Mod: PBBFAC,,, | Performed by: PHYSICIAN ASSISTANT

## 2024-08-13 PROCEDURE — 3077F SYST BP >= 140 MM HG: CPT | Mod: CPTII,S$GLB,, | Performed by: PHYSICIAN ASSISTANT

## 2024-08-13 RX ORDER — LOSARTAN POTASSIUM 25 MG/1
1 TABLET ORAL DAILY
COMMUNITY
Start: 2024-07-23 | End: 2024-09-23

## 2024-08-13 NOTE — PROGRESS NOTES
Cardiology Clinic Note  Reason for Visit: F/u afib   Initial visit with Dr. Mendoza: 9/12/2023    HPI:     Problem List and HPI:   CAD  - LAD stent 6/2013  CEA  R 2018  - follows with Dr. Carlos at [facility]  Renal artery stenosis  - s/p stent   Atrial fib  - s/p PVI with Dr. Zhou 11/2023  CKD III  Hypothyroidism      Trudy Greenfield is a 84 y.o. F, who presents for follow up.  She underwent an ablation for AFib with Dr. Zhou in November.  EKG today shows sinus rhythm with first-degree AV block.  She is scheduled to follow-up with Yi Jackman on January 30th.  She is currently taking amiodarone 200 mg b.i.d..  She has pre-existing hypothyroidism, which is recently worsened.  Condition is being managed by her primary care doctor.  She states that prior to developing AFib again in June she had been taking 2.5 mg Eliquis.  She has bilateral carotid stenosis, and plans to continue seeing Dr. Tierney to monitor this condition.  He performed her right CEA in 2018. She has no acute complaints today and denies chest pain/pressure/tightness/discomfort, dyspnea on regular exertion, orthopnea, PND, peripheral edema, sustained palpitations, syncope or claudication symptoms.       ROS:    Pertinent ROS included in HPI and below.  PMH:     Past Medical History:   Diagnosis Date    A-fib     Atrial flutter     Carotid stenosis     Coronary artery disease     Renal artery stenosis      Past Surgical History:   Procedure Laterality Date    ABLATION OF ARRHYTHMOGENIC FOCUS FOR ATRIAL FIBRILLATION N/A 11/13/2023    Procedure: Ablation atrial fibrillation;  Surgeon: Steven Zhou MD;  Location: [facility] EP LAB;  Service: Cardiology;  Laterality: N/A;  AF/AFL, LEONEL (cx if SR), PVI redo,,RFA for Atyp AFL , [place], [place], [place], 3prep **IODINE allergy**    ABLATION, ATRIAL FLUTTER, ATYPICAL N/A 11/13/2023    Procedure: ABLATION, ARRHYTHMOGENIC FOCUS, FOR ATYPICAL ATRIAL FLUTTER;  Surgeon: Steven Zhou MD;  Location: [facility] EP LAB;   Service: Cardiology;  Laterality: N/A;    CAROTID ENDARTERECTOMY Right     CATARACT EXTRACTION Bilateral     CORONARY ANGIOPLASTY WITH STENT PLACEMENT      ECHOCARDIOGRAM,TRANSESOPHAGEAL N/A 11/13/2023    Procedure: Transesophageal echo (LEONEL) intra-procedure log documentation;  Surgeon: Clayton Johns MD;  Location: [facility] EP LAB;  Service: Cardiology;  Laterality: N/A;    RENAL ARTERY STENT       Allergies:     Review of patient's allergies indicates:   Allergen Reactions    Iodine Other (See Comments)    Losartan      Cough     Valsartan      diarrhea    Codeine Rash     Medications:     Current Outpatient Medications on File Prior to Visit   Medication Sig Dispense Refill    aspirin (ECOTRIN) 81 MG EC tablet Take 81 mg by mouth every Mon, Wed, Fri.      ezetimibe (ZETIA) 10 mg tablet Take 1 tablet (10 mg total) by mouth once daily. 90 tablet 3    ferrous sulfate (FEOSOL) 325 mg (65 mg iron) Tab tablet Take 325 mg by mouth daily with breakfast.      fluticasone propionate (FLOVENT DISKUS) 50 mcg/actuation DsDv Inhale 2 sprays into the lungs once daily. Controller      furosemide (LASIX) 20 MG tablet Take 1 tablet (20 mg total) by mouth as needed. 30 tablet 6    levothyroxine (SYNTHROID) 75 MCG tablet Take 75 mcg by mouth before breakfast.      loratadine (CLARITIN) 10 mg tablet Take 10 mg by mouth daily as needed.      MAGNESIUM GLYCINATE ORAL Take 1 tablet by mouth 3 (three) times daily. 400mg total for the day      metoprolol succinate (TOPROL-XL) 50 MG 24 hr tablet Take 1 tablet (50 mg total) by mouth once daily. 90 tablet 3    ubidecarenone (COENZYME Q10 ORAL) Take 1 capsule by mouth once daily.      vit A/vit C/vit E/zinc/copper (PRESERVISION AREDS ORAL) Take 1 tablet by mouth 2 (two) times a day.       No current facility-administered medications on file prior to visit.     Social History:     Social History     Tobacco Use    Smoking status: Never    Smokeless tobacco: Never   Substance Use Topics     "Alcohol use: Never     Family History:   No family history on file.  Physical Exam:   BP (!) 156/82   Pulse 61   Ht 5' 8" (1.727 m)   Wt 97.2 kg (214 lb 3.2 oz)   LMP  (LMP Unknown)   BMI 32.57 kg/m²      Physical Exam  Vitals and nursing note reviewed.   Constitutional:       Appearance: Normal appearance.   HENT:      Head: Normocephalic and atraumatic.   Neck:      Vascular: Normal carotid pulses. No carotid bruit or hepatojugular reflux.   Cardiovascular:      Rate and Rhythm: Normal rate and regular rhythm.      Chest Wall: PMI is not displaced.      Pulses:           Carotid pulses are  on the right side with bruit and  on the left side with bruit.       Radial pulses are 2+ on the right side and 2+ on the left side.        Dorsalis pedis pulses are 2+ on the right side and 2+ on the left side.        Posterior tibial pulses are 2+ on the right side and 2+ on the left side.   Pulmonary:      Effort: Pulmonary effort is normal.      Breath sounds: Normal breath sounds. No wheezing, rhonchi or rales.   Abdominal:      General: Bowel sounds are normal. There is no abdominal bruit.      Palpations: Abdomen is soft. There is no pulsatile mass.      Tenderness: There is no abdominal tenderness.   Feet:      Right foot:      Skin integrity: Skin integrity normal.      Left foot:      Skin integrity: Skin integrity normal.   Skin:     Capillary Refill: Capillary refill takes less than 2 seconds.   Neurological:      General: No focal deficit present.      Mental Status: She is alert.   Psychiatric:         Mood and Affect: Mood and affect normal.         Speech: Speech normal.         Behavior: Behavior is cooperative.         Thought Content: Thought content normal.          Labs:     Blood Tests:  Lab Results   Component Value Date     10/18/2024    K 4.7 10/18/2024     10/18/2024    CO2 27 10/18/2024    BUN 27 (H) 10/18/2024    BUN 31.0 (H) 11/06/2023    CREATININE 1.1 10/18/2024     " 10/18/2024    HGBA1C 5.9 (H) 09/12/2023    AST 23 07/22/2024    AST 29 12/18/2023    ALT 16 07/22/2024    ALT 24 12/18/2023    ALBUMIN 3.7 07/22/2024    ALBUMIN 3.6 12/18/2023    PROT 6.8 12/18/2023    BILITOT 0.5 07/22/2024    BILITOT 0.7 12/18/2023    WBC 8.0 11/06/2023    HGB 14.2 11/06/2023    HCT 40.0 12/18/2023    MCV 89.2 11/06/2023     11/06/2023    INR 1.2 11/06/2023    TSH 6.30 (H) 06/02/2022       Lab Results   Component Value Date    CHOL 137 12/18/2023    HDL 56 12/18/2023    TRIG 67 12/18/2023       Lab Results   Component Value Date    LDLCALC 67.6 12/18/2023         Imaging:     Echocardiogram  LEONEL 11/13/2023    LEONEL performed to assess LA/ERICA prior to ablation.  No evidence of intracardiac thrombus.    Left Ventricle: Normal wall motion. There is normal systolic function with a visually estimated ejection fraction of 55 - 60%.    Right Ventricle: Normal right ventricular cavity size. Systolic function is mildly reduced.    Left Atrium: Left atrium is dilated. The left atrial appendage appears normal. The left atrial appendage has a cauliflower morphology. Appendage velocity is reduced at less than 40 cm/sec. There is no thrombus in the left atrial appendage.    Right Atrium: Right atrium is dilated.    Mitral Valve: There is mild regurgitation.    Tricuspid Valve: There is mild regurgitation.       Stress testing  None    Cath Lab  None    Other  Carotid US 8/17/2023  COMMENTS:   Right:  50-69% internal carotid artery stenosis     Left:  50-69% internal carotid artery stenosis     Assessment:     1. Coronary artery disease involving native coronary artery of native heart without angina pectoris (had prior PCI)    2. S/P drug eluting coronary stent placement    3. Persistent atrial fibrillation    4. Anticoagulated    5. Hypothyroidism due to amiodarone    6. Stenosis of right carotid artery s/p CEA    7. Renal artery stenosis    8. Primary hypertension    9. Stage 3b chronic kidney disease     10. Pure hypercholesterolemia          Plan:     Coronary artery disease involving native coronary artery of native heart without angina pectoris (had prior PCI)  S/P drug eluting coronary stent placement  Continue aspirin every other day and atorvastatin 20 mg q.d.    Persistent atrial fibrillation  Anticoagulated  Hypothyroidism due to amiodarone  Has follow-up scheduled with EP January 30th and will discuss appropriate duration of amiodarone at that time.  Continue Eliquis 5 mg b.i.d. and metoprolol succinate 50 mg q.d.    Stenosis of right carotid artery s/p CEA  Renal artery stenosis  Plans to continue following with vascular surgery at [facility] [place] [place]    Primary hypertension  Stable, continue current medications  Recommend low-sodium diet and monitoring at home    Stage 3b chronic kidney disease  Follows with Nephrology    Pure hypercholesterolemia  Last fasting lipid panel from December 18, 2023 shows LDL well-controlled at 67.6.    Continue atorvastatin 20 mg q.d.      Signed:  Dionna Haynes PA-C  Cardiology     11/13/2024 12:33 PM    Follow-up:     No future appointments.

## 2024-08-19 DIAGNOSIS — I10 PRIMARY HYPERTENSION: ICD-10-CM

## 2024-08-19 RX ORDER — CHLORTHALIDONE 25 MG/1
25 TABLET ORAL
Qty: 90 TABLET | Refills: 3 | Status: SHIPPED | OUTPATIENT
Start: 2024-08-19

## 2024-09-12 ENCOUNTER — TELEPHONE (OUTPATIENT)
Dept: CARDIOLOGY | Facility: CLINIC | Age: 84
End: 2024-09-12
Payer: MEDICARE

## 2024-09-12 NOTE — TELEPHONE ENCOUNTER
Pt reported readings as listed below:  (Pt could not report accurate times BP were taken, also states BP were taken before meds)     8/15/24 150/70  8/16/24 152/60  8/18/24 140/70  8/19/24 133/63  8/20/24 131/76   8/22/24 144/73  8/23/24 118/65  8/24/24 139/67    9/12/24 155/77

## 2024-09-12 NOTE — TELEPHONE ENCOUNTER
----- Message from Dionna Haynes PA-C sent at 9/12/2024  9:44 AM CDT -----  Please call this patient and see how her BP readings at home have been?     Thanks  ----- Message -----  From: Dionna Haynes PA-C  Sent: 8/20/2024  12:00 AM CDT  To: Dionna Haynes PA-C; #    Please call the patient and get her BP readings form home.     Check patients BP and BMP results to decide if losartan should be increased.

## 2024-09-16 NOTE — TELEPHONE ENCOUNTER
Pt notified per Dionna smith, will reach out to pt in one week to get BP readings again. Pt verbalized understanding.

## 2024-09-23 ENCOUNTER — TELEPHONE (OUTPATIENT)
Dept: CARDIOLOGY | Facility: CLINIC | Age: 84
End: 2024-09-23
Payer: MEDICARE

## 2024-09-23 DIAGNOSIS — I10 ESSENTIAL HYPERTENSION: Primary | ICD-10-CM

## 2024-09-23 RX ORDER — VALSARTAN 80 MG/1
80 TABLET ORAL DAILY
Qty: 90 TABLET | Refills: 3 | Status: SHIPPED | OUTPATIENT
Start: 2024-09-23 | End: 2025-09-23

## 2024-09-23 NOTE — TELEPHONE ENCOUNTER
Pt notified per Dionna smith note, blood work has been scheduled for 10/7/24. Will call pt back on 10/3 to get BP readings. Pt verbalized understanding.

## 2024-09-23 NOTE — TELEPHONE ENCOUNTER
Pt reports BP readings as listed below.   (Times not accurately reported)     9/21/23 (Before meds)-164/73  61  (After meds) - 152/73  65    9/22/23 (Before meds)- 152/65 63  (After meds) - 139/54  64    9/23/23 (Before meds)- @10:10AM- 137/64 65  (After meds) - @11:10AM -156/73 64

## 2024-10-01 DIAGNOSIS — I25.10 CORONARY ARTERY DISEASE INVOLVING NATIVE CORONARY ARTERY OF NATIVE HEART WITHOUT ANGINA PECTORIS: Primary | ICD-10-CM

## 2024-10-01 RX ORDER — ATORVASTATIN CALCIUM 40 MG/1
40 TABLET, FILM COATED ORAL DAILY
Qty: 90 TABLET | Refills: 3 | Status: SHIPPED | OUTPATIENT
Start: 2024-10-01 | End: 2025-10-01

## 2024-10-03 DIAGNOSIS — I10 ESSENTIAL HYPERTENSION: ICD-10-CM

## 2024-10-03 RX ORDER — VALSARTAN 160 MG/1
160 TABLET ORAL DAILY
Qty: 90 TABLET | Refills: 3 | Status: SHIPPED | OUTPATIENT
Start: 2024-10-03 | End: 2025-10-03

## 2024-10-04 ENCOUNTER — TELEPHONE (OUTPATIENT)
Dept: CARDIOLOGY | Facility: CLINIC | Age: 84
End: 2024-10-04
Payer: MEDICARE

## 2024-10-04 NOTE — TELEPHONE ENCOUNTER
----- Message from Dionna Haynes PA-C sent at 10/3/2024  3:57 PM CDT -----  Regarding: RE: BP readings since med change  Please let her know the BP readings remain above the goal of < 130/80. I am going to recommend increasing valsartan to 160 mg once daily. I am sending that into [facility] for her now. She has a pending order. She has an order pending for a BMP. Please ask her to have that drawn in two weeks.     Thanks,   Dionna  ----- Message -----  From: Kenzie Dickerson MA  Sent: 10/3/2024  11:12 AM CDT  To: Dionna Haynes PA-C  Subject: BP readings since med change                     PT REQUEST MED TO BE SENT TO [facility] IF SHE IS TO CONTINUE TAKING VALSARTAN     9/24/24   -(BEFORE MED) @9:30am 158/68  59  -(AFTER MED) @10:30am  157/67  58    9/25/24  -(BEFORE MED)  @8am   139/61  61  -(AFTER MED)    @9:20am  134/60  61    9/26/24  -(BEFORE MED)  @ 8am  157/69  66  -(AFTER MED)  @1pm  147/63  70    9/27/24  -(BEFORE MED)  @ 8am  142/65  65  -( AFTER MED)   @1pm   147/64    9/28/24  -(BEFORE MED)  @ 8:45am  157/70  61  -(AFTER MED)  @11am  152/68  66    9/29/24 - skipped     9/30/24  -(BEFORE MED) @ 8am  168/ 77  67  -(AFTER MED) @ 9am   144/64  74    10/1/24  -(BEFORE MED)  @ 8:15am  133/59  68  -(AFTER MED)  @11:15am  121/67  60    10/2/24  -(BEFORE MED)  @7:20am  136/69  67  -(AFTER MED)  @ 2pm  128/68  68    10/3/24  -(BEFORE MED)  @8:45am 149/74  64  -(AFTER MED)  @ 10am  143/76  61

## 2024-10-04 NOTE — TELEPHONE ENCOUNTER
Spoke with pt, notified pt per iDonna Haynes note, pt verbalized understanding. Lab work has been scheduled.   Future Appointments   Date Time Provider Department Center   10/18/2024  9:00 AM SAURAV SANCHEZ

## 2024-10-09 DIAGNOSIS — I48.19 PERSISTENT ATRIAL FIBRILLATION: ICD-10-CM

## 2024-10-09 RX ORDER — APIXABAN 5 MG/1
5 TABLET, FILM COATED ORAL 2 TIMES DAILY
Qty: 180 TABLET | Refills: 3 | Status: SHIPPED | OUTPATIENT
Start: 2024-10-09

## 2024-10-18 ENCOUNTER — LAB VISIT (OUTPATIENT)
Dept: LAB | Facility: HOSPITAL | Age: 84
End: 2024-10-18
Attending: PHYSICIAN ASSISTANT
Payer: MEDICARE

## 2024-10-18 DIAGNOSIS — I10 ESSENTIAL HYPERTENSION: ICD-10-CM

## 2024-10-18 LAB
ANION GAP SERPL CALC-SCNC: 8 MMOL/L (ref 8–16)
BUN SERPL-MCNC: 27 MG/DL (ref 8–23)
CALCIUM SERPL-MCNC: 9.7 MG/DL (ref 8.7–10.5)
CHLORIDE SERPL-SCNC: 104 MMOL/L (ref 95–110)
CO2 SERPL-SCNC: 27 MMOL/L (ref 23–29)
CREAT SERPL-MCNC: 1.1 MG/DL (ref 0.5–1.4)
EST. GFR  (NO RACE VARIABLE): 49.5 ML/MIN/1.73 M^2
GLUCOSE SERPL-MCNC: 100 MG/DL (ref 70–110)
POTASSIUM SERPL-SCNC: 4.7 MMOL/L (ref 3.5–5.1)
SODIUM SERPL-SCNC: 139 MMOL/L (ref 136–145)

## 2024-10-18 PROCEDURE — 36415 COLL VENOUS BLD VENIPUNCTURE: CPT | Mod: PO | Performed by: PHYSICIAN ASSISTANT

## 2024-10-18 PROCEDURE — 80048 BASIC METABOLIC PNL TOTAL CA: CPT | Performed by: PHYSICIAN ASSISTANT

## 2024-10-19 ENCOUNTER — PATIENT MESSAGE (OUTPATIENT)
Dept: CARDIOLOGY | Facility: CLINIC | Age: 84
End: 2024-10-19
Payer: MEDICARE

## 2024-10-21 DIAGNOSIS — I25.10 CORONARY ARTERY DISEASE INVOLVING NATIVE CORONARY ARTERY OF NATIVE HEART WITHOUT ANGINA PECTORIS: ICD-10-CM

## 2024-10-21 DIAGNOSIS — I10 ESSENTIAL HYPERTENSION: Primary | ICD-10-CM

## 2024-10-21 RX ORDER — NIFEDIPINE 60 MG/1
60 TABLET, EXTENDED RELEASE ORAL DAILY
Qty: 30 TABLET | Refills: 11 | Status: SHIPPED | OUTPATIENT
Start: 2024-10-21 | End: 2025-10-21

## 2024-10-21 RX ORDER — ATORVASTATIN CALCIUM 20 MG/1
20 TABLET, FILM COATED ORAL DAILY
Qty: 90 TABLET | Refills: 3 | Status: SHIPPED | OUTPATIENT
Start: 2024-10-21 | End: 2025-10-21

## 2024-11-20 ENCOUNTER — PATIENT MESSAGE (OUTPATIENT)
Dept: CARDIOLOGY | Facility: CLINIC | Age: 84
End: 2024-11-20
Payer: MEDICARE

## 2024-11-22 DIAGNOSIS — I48.19 PERSISTENT ATRIAL FIBRILLATION: Primary | ICD-10-CM

## 2024-11-22 DIAGNOSIS — I48.19 PERSISTENT ATRIAL FIBRILLATION: ICD-10-CM

## 2024-11-22 RX ORDER — METOPROLOL SUCCINATE 50 MG/1
50 TABLET, EXTENDED RELEASE ORAL DAILY
Qty: 90 TABLET | Refills: 3 | Status: SHIPPED | OUTPATIENT
Start: 2024-11-22

## 2024-11-25 DIAGNOSIS — R00.2 PALPITATIONS: Primary | ICD-10-CM

## 2025-02-24 ENCOUNTER — TELEPHONE (OUTPATIENT)
Dept: CARDIOLOGY | Facility: CLINIC | Age: 85
End: 2025-02-24
Payer: MEDICARE

## 2025-02-24 NOTE — TELEPHONE ENCOUNTER
Patient called to reschedule 1030 appt due to the provider not being in clinic today.  Patient is okay with not coming today and wants to be seen at [facility] instead.  Any morning except Wednesday.

## 2025-02-25 ENCOUNTER — PATIENT MESSAGE (OUTPATIENT)
Dept: CARDIOLOGY | Facility: CLINIC | Age: 85
End: 2025-02-25
Payer: MEDICARE

## 2025-02-26 NOTE — TELEPHONE ENCOUNTER
Pt requested [facility], rescheduled for 3/11 for 6 mths f/u and she agreed to date/time of appointment(s). Told pt to arrive 15-20 mn early and she agreed to date/time of appointment(s).  Not sure why she would need an EKG, followed by dr Zhou.

## 2025-03-11 ENCOUNTER — OFFICE VISIT (OUTPATIENT)
Dept: CARDIOLOGY | Facility: CLINIC | Age: 85
End: 2025-03-11
Payer: MEDICARE

## 2025-03-11 VITALS
HEIGHT: 68 IN | BODY MASS INDEX: 33.15 KG/M2 | SYSTOLIC BLOOD PRESSURE: 132 MMHG | WEIGHT: 218.69 LBS | HEART RATE: 80 BPM | DIASTOLIC BLOOD PRESSURE: 62 MMHG

## 2025-03-11 DIAGNOSIS — I48.19 PERSISTENT ATRIAL FIBRILLATION: ICD-10-CM

## 2025-03-11 DIAGNOSIS — I10 ESSENTIAL HYPERTENSION: Primary | ICD-10-CM

## 2025-03-11 DIAGNOSIS — Z79.01 ANTICOAGULATED: ICD-10-CM

## 2025-03-11 DIAGNOSIS — E78.00 PURE HYPERCHOLESTEROLEMIA: ICD-10-CM

## 2025-03-11 DIAGNOSIS — I65.21 STENOSIS OF RIGHT CAROTID ARTERY: ICD-10-CM

## 2025-03-11 DIAGNOSIS — Z20.828 EXPOSURE TO THE FLU: ICD-10-CM

## 2025-03-11 DIAGNOSIS — N18.32 STAGE 3B CHRONIC KIDNEY DISEASE: ICD-10-CM

## 2025-03-11 DIAGNOSIS — I70.1 RENAL ARTERY STENOSIS: ICD-10-CM

## 2025-03-11 DIAGNOSIS — Z95.5 S/P DRUG ELUTING CORONARY STENT PLACEMENT: ICD-10-CM

## 2025-03-11 DIAGNOSIS — I25.10 CORONARY ARTERY DISEASE INVOLVING NATIVE CORONARY ARTERY OF NATIVE HEART WITHOUT ANGINA PECTORIS: ICD-10-CM

## 2025-03-11 PROBLEM — I13.0 HYPERTENSIVE HEART AND CHRONIC KIDNEY DISEASE WITH HEART FAILURE AND STAGE 1 THROUGH STAGE 4 CHRONIC KIDNEY DISEASE, OR UNSPECIFIED CHRONIC KIDNEY DISEASE: Status: RESOLVED | Noted: 2024-05-03 | Resolved: 2025-03-11

## 2025-03-11 LAB
OHS QRS DURATION: 82 MS
OHS QTC CALCULATION: 420 MS

## 2025-03-11 PROCEDURE — 3288F FALL RISK ASSESSMENT DOCD: CPT | Mod: CPTII,S$GLB,, | Performed by: PHYSICIAN ASSISTANT

## 2025-03-11 PROCEDURE — 3075F SYST BP GE 130 - 139MM HG: CPT | Mod: CPTII,S$GLB,, | Performed by: PHYSICIAN ASSISTANT

## 2025-03-11 PROCEDURE — 99214 OFFICE O/P EST MOD 30 MIN: CPT | Mod: S$GLB,,, | Performed by: PHYSICIAN ASSISTANT

## 2025-03-11 PROCEDURE — 3078F DIAST BP <80 MM HG: CPT | Mod: CPTII,S$GLB,, | Performed by: PHYSICIAN ASSISTANT

## 2025-03-11 PROCEDURE — 99999 PR PBB SHADOW E&M-EST. PATIENT-LVL III: CPT | Mod: PBBFAC,,, | Performed by: PHYSICIAN ASSISTANT

## 2025-03-11 PROCEDURE — 93005 ELECTROCARDIOGRAM TRACING: CPT | Mod: S$GLB,,, | Performed by: PHYSICIAN ASSISTANT

## 2025-03-11 PROCEDURE — 1159F MED LIST DOCD IN RCRD: CPT | Mod: CPTII,S$GLB,, | Performed by: PHYSICIAN ASSISTANT

## 2025-03-11 PROCEDURE — 93010 ELECTROCARDIOGRAM REPORT: CPT | Mod: S$GLB,,, | Performed by: INTERNAL MEDICINE

## 2025-03-11 PROCEDURE — 1101F PT FALLS ASSESS-DOCD LE1/YR: CPT | Mod: CPTII,S$GLB,, | Performed by: PHYSICIAN ASSISTANT

## 2025-03-11 RX ORDER — LEVOTHYROXINE SODIUM 88 UG/1
88 TABLET ORAL
COMMUNITY
Start: 2025-02-13

## 2025-03-11 RX ORDER — OSELTAMIVIR PHOSPHATE 75 MG/1
75 CAPSULE ORAL 2 TIMES DAILY
Qty: 10 CAPSULE | Refills: 0 | Status: SHIPPED | OUTPATIENT
Start: 2025-03-11 | End: 2025-03-16

## 2025-03-11 RX ORDER — HYDROCORTISONE 25 MG/G
30 CREAM TOPICAL DAILY PRN
COMMUNITY
Start: 2024-11-21

## 2025-03-11 RX ORDER — VALSARTAN 80 MG/1
TABLET ORAL
Qty: 270 TABLET | Refills: 3 | Status: SHIPPED | OUTPATIENT
Start: 2025-03-11 | End: 2025-03-11

## 2025-03-11 RX ORDER — VALSARTAN 80 MG/1
TABLET ORAL
Qty: 270 TABLET | Refills: 3 | Status: SHIPPED | OUTPATIENT
Start: 2025-03-11

## 2025-03-11 NOTE — PATIENT INSTRUCTIONS
Take two tablets of valsartan (160 mg) in the AM, and one tablet of valsartan (80 mg in the PM)   Ideally your blood pressure should be around 130/80 at least 75% of the time   Continue chlorthalidone 25 mg in the morning as well    If you are doing well with valsartan, but your blood pressure remains elevated, we can increase valsartan to 160 mg twice daily. The maximum recommended dose daily is 320 mg total.     If you are having any side effects with valsartan another option could be to switch from metoprolol to a similar medication called carvedilol 25 mg twice daily. Both of these medications control/prevent afib, but carvedilol is better at lowering blood pressure.     If your blood pressure is temporarily elevated for a few days due to salt, pain or not sleeping well, you can take 2-3 days worth of furosemide 20 mg.

## 2025-03-11 NOTE — PROGRESS NOTES
Cardiology Clinic Note  Reason for Visit: HTN  General Cardiologist: Dr. Mendoza     HPI:     Problem List and HPI:   CAD  - LAD stent 6/2013  CEA  R 2018  - follows with Dr. Carlos at [facility] [place]  Renal artery stenosis  - s/p stent   Atrial fib  - s/p PVI with Dr. Zhou 11/2023  CKD III  Hypothyroidism      Trudy Greenfield is a 84 y.o. F, who presents today to discuss blood pressure management.  Towards the end of last year she was prescribed nifedipine, which caused leg swelling.  She then restarted valsartan and has noticed that her blood pressure seems to be well-controlled around a total of 240 mg of valsartan daily.  There was some concern for valsartan being related to her back pain, but in retrospect this may have been a coincidence.  She did experience a cough on losartan.  She continues to take chlorthalidone 25 mg once daily as well.  She has not had any recent palpitations.  She has superficial bruising on Eliquis 5 mg b.i.d., but she feels this is tolerable given her CVA risk.    JOHANNA Haynes HPI 8/13/2024  Presents for follow up.  She underwent an ablation for AFib with Dr. Zhou in November.  EKG today shows sinus rhythm with first-degree AV block.  She is scheduled to follow-up with Yi Jackman on January 30th.  She is currently taking amiodarone 200 mg b.i.d..  She has pre-existing hypothyroidism, which is recently worsened.  Condition is being managed by her primary care doctor.  She states that prior to developing AFib again in June she had been taking 2.5 mg Eliquis.  She has bilateral carotid stenosis, and plans to continue seeing Dr. Tierney to monitor this condition.  He performed her right CEA in 2018. She has no acute complaints today and denies chest pain/pressure/tightness/discomfort, dyspnea on regular exertion, orthopnea, PND, peripheral edema, sustained palpitations, syncope or claudication symptoms.       ROS:    Pertinent ROS included in HPI and below.  PMH:     Past Medical  History:   Diagnosis Date    A-fib     Atrial flutter     Carotid stenosis     Coronary artery disease     Renal artery stenosis      Past Surgical History:   Procedure Laterality Date    ABLATION OF ARRHYTHMOGENIC FOCUS FOR ATRIAL FIBRILLATION N/A 11/13/2023    Procedure: Ablation atrial fibrillation;  Surgeon: Steven Zhou MD;  Location: [facility] EP LAB;  Service: Cardiology;  Laterality: N/A;  AF/AFL, LEONEL (cx if SR), PVI redo,,RFA for Atyp AFL , Carto, Gen, TN, 3prep **IODINE allergy**    ABLATION, ATRIAL FLUTTER, ATYPICAL N/A 11/13/2023    Procedure: ABLATION, ARRHYTHMOGENIC FOCUS, FOR ATYPICAL ATRIAL FLUTTER;  Surgeon: Steven Zhou MD;  Location: [facility] EP LAB;  Service: Cardiology;  Laterality: N/A;    CAROTID ENDARTERECTOMY Right     CATARACT EXTRACTION Bilateral     CORONARY ANGIOPLASTY WITH STENT PLACEMENT      ECHOCARDIOGRAM,TRANSESOPHAGEAL N/A 11/13/2023    Procedure: Transesophageal echo (LEONEL) intra-procedure log documentation;  Surgeon: Clayton Johns MD;  Location: [facility] EP LAB;  Service: Cardiology;  Laterality: N/A;    RENAL ARTERY STENT       Allergies:     Review of patient's allergies indicates:   Allergen Reactions    Iodine Other (See Comments)    Losartan      Cough     Nifedipine      Leg swelling on 60mg    Codeine Rash     Medications:     Current Outpatient Medications on File Prior to Visit   Medication Sig Dispense Refill    apixaban (ELIQUIS) 5 mg Tab Take 1 tablet (5 mg total) by mouth 2 (two) times daily. 180 tablet 3    aspirin (ECOTRIN) 81 MG EC tablet Take 81 mg by mouth every Mon, Wed, Fri.      atorvastatin (LIPITOR) 20 MG tablet Take 1 tablet (20 mg total) by mouth once daily. 90 tablet 3    chlorthalidone (HYGROTEN) 25 MG Tab TAKE 1 TABLET ONE TIME DAILY 90 tablet 3    ezetimibe (ZETIA) 10 mg tablet Take 1 tablet (10 mg total) by mouth once daily. 90 tablet 3    ferrous sulfate (FEOSOL) 325 mg (65 mg iron) Tab tablet Take 325 mg by mouth daily with breakfast.      fluticasone  "propionate (FLOVENT DISKUS) 50 mcg/actuation DsDv Inhale 2 sprays into the lungs once daily. Controller      furosemide (LASIX) 20 MG tablet Take 1 tablet (20 mg total) by mouth as needed. 30 tablet 6    hydrocortisone 2.5 % cream Apply 30 Tubes topically daily as needed (as needed prn).      levothyroxine (SYNTHROID) 88 MCG tablet Take 88 mcg by mouth.      loratadine (CLARITIN) 10 mg tablet Take 10 mg by mouth daily as needed.      MAGNESIUM GLYCINATE ORAL Take 1 tablet by mouth 3 (three) times daily. 400mg total for the day      metoprolol succinate (TOPROL-XL) 50 MG 24 hr tablet TAKE 1 TABLET (50 MG TOTAL) BY MOUTH ONCE DAILY. 90 tablet 3    ubidecarenone (COENZYME Q10 ORAL) Take 1 capsule by mouth once daily.      vit A/vit C/vit E/zinc/copper (PRESERVISION AREDS ORAL) Take 1 tablet by mouth 2 (two) times a day.      [DISCONTINUED] levothyroxine (SYNTHROID) 75 MCG tablet Take 88 mcg by mouth before breakfast.      [DISCONTINUED] NIFEdipine (PROCARDIA-XL) 60 MG (OSM) 24 hr tablet Take 1 tablet (60 mg total) by mouth once daily. 30 tablet 11     No current facility-administered medications on file prior to visit.     Social History:     Social History     Tobacco Use    Smoking status: Never    Smokeless tobacco: Never   Substance Use Topics    Alcohol use: Never     Family History:   No family history on file.  Physical Exam:   /62 (BP Location: Left arm, Patient Position: Sitting)   Pulse 80   Ht 5' 8" (1.727 m)   Wt 99.2 kg (218 lb 11.1 oz)   LMP  (LMP Unknown)   BMI 33.25 kg/m²      Physical Exam  Vitals and nursing note reviewed.   Constitutional:       Appearance: Normal appearance.   HENT:      Head: Normocephalic and atraumatic.   Neck:      Vascular: Normal carotid pulses. No carotid bruit or hepatojugular reflux.   Cardiovascular:      Rate and Rhythm: Normal rate and regular rhythm.      Chest Wall: PMI is not displaced.      Pulses:           Carotid pulses are  on the right side with bruit " and  on the left side with bruit.       Radial pulses are 2+ on the right side and 2+ on the left side.        Dorsalis pedis pulses are 2+ on the right side and 2+ on the left side.        Posterior tibial pulses are 2+ on the right side and 2+ on the left side.   Pulmonary:      Effort: Pulmonary effort is normal.      Breath sounds: Normal breath sounds. No wheezing, rhonchi or rales.   Abdominal:      General: Bowel sounds are normal. There is no abdominal bruit.      Palpations: Abdomen is soft. There is no pulsatile mass.      Tenderness: There is no abdominal tenderness.   Feet:      Right foot:      Skin integrity: Skin integrity normal.      Left foot:      Skin integrity: Skin integrity normal.   Skin:     Capillary Refill: Capillary refill takes less than 2 seconds.   Neurological:      General: No focal deficit present.      Mental Status: She is alert.   Psychiatric:         Mood and Affect: Mood and affect normal.         Speech: Speech normal.         Behavior: Behavior is cooperative.         Thought Content: Thought content normal.          Labs:     Blood Tests:  Lab Results   Component Value Date     02/11/2025     10/18/2024    K 4.6 02/11/2025    K 4.7 10/18/2024     10/18/2024    CO2 29 02/11/2025    CO2 27 10/18/2024    BUN 32 (H) 02/11/2025    BUN 27 (H) 10/18/2024    BUN 31.0 (H) 11/06/2023    CREATININE 1.25 (H) 02/11/2025    CREATININE 1.1 10/18/2024    GLU 93 02/11/2025     10/18/2024    HGBA1C 5.9 (H) 02/11/2025    HGBA1C 5.9 (H) 09/12/2023    AST 20 02/11/2025    AST 29 12/18/2023    ALT 15 02/11/2025    ALT 24 12/18/2023    ALBUMIN 3.8 02/11/2025    ALBUMIN 3.6 12/18/2023    PROT 6.8 12/18/2023    BILITOT 0.5 02/11/2025    BILITOT 0.7 12/18/2023    WBC 8.0 11/06/2023    HGB 14.2 11/06/2023    HCT 40.0 12/18/2023    MCV 89.2 11/06/2023     11/06/2023    INR 1.2 11/06/2023    TSH 6.30 (H) 06/02/2022       Lab Results   Component Value Date    CHOL 137  12/18/2023    HDL 56 12/18/2023    TRIG 67 12/18/2023       Lab Results   Component Value Date    LDLCALC 67.6 12/18/2023         Imaging:     Echocardiogram  LEONEL 11/13/2023    LEONEL performed to assess [place] prior to ablation.  No evidence of intracardiac thrombus.    Left Ventricle: Normal wall motion. There is normal systolic function with a visually estimated ejection fraction of 55 - 60%.    Right Ventricle: Normal right ventricular cavity size. Systolic function is mildly reduced.    Left Atrium: Left atrium is dilated. The left atrial appendage appears normal. The left atrial appendage has a cauliflower morphology. Appendage velocity is reduced at less than 40 cm/sec. There is no thrombus in the left atrial appendage.    Right Atrium: Right atrium is dilated.    Mitral Valve: There is mild regurgitation.    Tricuspid Valve: There is mild regurgitation.       Stress testing  None    Cath Lab  None    Other  Carotid US 8/17/2023  COMMENTS:   Right:  50-69% internal carotid artery stenosis     Left:  50-69% internal carotid artery stenosis     EKG:  3/11/2025  Normal sinus rhythm   Nonspecific T wave abnormality     Assessment:     1. Essential hypertension    2. Exposure to the flu    3. Coronary artery disease involving native coronary artery of native heart without angina pectoris (had prior PCI)    4. S/P drug eluting coronary stent placement    5. Persistent atrial fibrillation    6. Anticoagulated    7. Stenosis of right carotid artery s/p CEA    8. Renal artery stenosis    9. Stage 3b chronic kidney disease    10. Pure hypercholesterolemia      Plan:     Coronary artery disease involving native coronary artery of native heart without angina pectoris (had prior PCI)  S/P drug eluting coronary stent placement  Continue aspirin every other day and atorvastatin 20 mg q.d.    Persistent atrial fibrillation  Anticoagulated  Continue Eliquis 5 mg b.i.d. and metoprolol succinate 50 mg q.d.    Stenosis of right  carotid artery s/p CEA  Renal artery stenosis  Plans to continue following with vascular surgery at [facility] [place]    Primary hypertension  Continue chlorthalidone 25 mg q.d.  Continue valsartan 160 mg q.a.m. and 80 mg q.p.m.  Recommend low-sodium diet    Stage 3b chronic kidney disease  Follows with Nephrology    Pure hypercholesterolemia  LDL is at goal per most recent fasting lipid panel.   Continue current medications.   Recommend annual monitoring.         Signed:  Dionna Haynes PA-C  Cardiology     3/11/2025 12:33 PM    Follow-up:     Future Appointments   Date Time Provider Department Center   3/26/2025  2:00 PM LAB, [facility] [place]

## 2025-03-17 ENCOUNTER — PATIENT MESSAGE (OUTPATIENT)
Dept: CARDIOLOGY | Facility: CLINIC | Age: 85
End: 2025-03-17
Payer: MEDICARE

## 2025-03-17 DIAGNOSIS — I10 ESSENTIAL HYPERTENSION: ICD-10-CM

## 2025-03-17 RX ORDER — VALSARTAN 160 MG/1
160 TABLET ORAL EVERY MORNING
Qty: 90 TABLET | Refills: 3 | Status: SHIPPED | OUTPATIENT
Start: 2025-03-17 | End: 2026-03-17

## 2025-03-17 RX ORDER — VALSARTAN 80 MG/1
80 TABLET ORAL NIGHTLY
Qty: 90 TABLET | Refills: 3 | Status: SHIPPED | OUTPATIENT
Start: 2025-03-17

## 2025-03-17 NOTE — TELEPHONE ENCOUNTER
Trudy Greenfield to JONNY [facility] Staff (supporting Dionna Haynes PA-C)  AC      3/17/25  9:36 AM  Dionna, you told me to let you know if Dianne gave me a problem with filling the Valsartan. They sent me a text saying they needed prior authorization to fill the prescription. Could you possibly check on this ? Thank you so much!

## 2025-04-09 ENCOUNTER — LAB VISIT (OUTPATIENT)
Dept: LAB | Facility: HOSPITAL | Age: 85
End: 2025-04-09
Payer: MEDICARE

## 2025-04-09 DIAGNOSIS — I10 ESSENTIAL HYPERTENSION: ICD-10-CM

## 2025-04-09 LAB
ANION GAP (OHS): 9 MMOL/L (ref 8–16)
BUN SERPL-MCNC: 31 MG/DL (ref 8–23)
CALCIUM SERPL-MCNC: 9.5 MG/DL (ref 8.7–10.5)
CHLORIDE SERPL-SCNC: 105 MMOL/L (ref 95–110)
CO2 SERPL-SCNC: 25 MMOL/L (ref 23–29)
CREAT SERPL-MCNC: 1 MG/DL (ref 0.5–1.4)
GFR SERPLBLD CREATININE-BSD FMLA CKD-EPI: 56 ML/MIN/1.73/M2
GLUCOSE SERPL-MCNC: 88 MG/DL (ref 70–110)
POTASSIUM SERPL-SCNC: 4.7 MMOL/L (ref 3.5–5.1)
SODIUM SERPL-SCNC: 139 MMOL/L (ref 136–145)

## 2025-04-09 PROCEDURE — 82310 ASSAY OF CALCIUM: CPT

## 2025-04-09 PROCEDURE — 36415 COLL VENOUS BLD VENIPUNCTURE: CPT | Mod: PO

## 2025-04-10 ENCOUNTER — RESULTS FOLLOW-UP (OUTPATIENT)
Dept: CARDIOLOGY | Facility: CLINIC | Age: 85
End: 2025-04-10

## 2025-06-22 DIAGNOSIS — I10 PRIMARY HYPERTENSION: ICD-10-CM

## 2025-06-22 RX ORDER — CHLORTHALIDONE 25 MG/1
25 TABLET ORAL
Qty: 90 TABLET | Refills: 3 | Status: SHIPPED | OUTPATIENT
Start: 2025-06-22

## 2025-06-22 NOTE — TELEPHONE ENCOUNTER
Refill Decision Note   rTudy Greenfield  is requesting a refill authorization.  Brief Assessment and Rationale for Refill:  Approve     Medication Therapy Plan:       Medication Reconciliation Completed: No   Comments:     No Care Gaps recommended.     Note composed:6:02 PM 06/22/2025

## 2025-08-18 ENCOUNTER — TELEPHONE (OUTPATIENT)
Dept: CARDIOLOGY | Facility: CLINIC | Age: 85
End: 2025-08-18
Payer: MEDICARE

## 2025-08-19 ENCOUNTER — OFFICE VISIT (OUTPATIENT)
Dept: CARDIOLOGY | Facility: CLINIC | Age: 85
End: 2025-08-19
Payer: MEDICARE

## 2025-08-19 VITALS
OXYGEN SATURATION: 93 % | WEIGHT: 214.31 LBS | SYSTOLIC BLOOD PRESSURE: 138 MMHG | DIASTOLIC BLOOD PRESSURE: 64 MMHG | HEIGHT: 68 IN | HEART RATE: 67 BPM | BODY MASS INDEX: 32.48 KG/M2

## 2025-08-19 DIAGNOSIS — I65.21 STENOSIS OF RIGHT CAROTID ARTERY: ICD-10-CM

## 2025-08-19 DIAGNOSIS — N18.32 STAGE 3B CHRONIC KIDNEY DISEASE: ICD-10-CM

## 2025-08-19 DIAGNOSIS — I10 PRIMARY HYPERTENSION: ICD-10-CM

## 2025-08-19 DIAGNOSIS — I48.19 PERSISTENT ATRIAL FIBRILLATION: ICD-10-CM

## 2025-08-19 DIAGNOSIS — I70.1 RENAL ARTERY STENOSIS: ICD-10-CM

## 2025-08-19 DIAGNOSIS — E78.49 OTHER HYPERLIPIDEMIA: ICD-10-CM

## 2025-08-19 DIAGNOSIS — I25.10 CORONARY ARTERY DISEASE INVOLVING NATIVE CORONARY ARTERY OF NATIVE HEART WITHOUT ANGINA PECTORIS: ICD-10-CM

## 2025-08-19 DIAGNOSIS — I10 ESSENTIAL HYPERTENSION: Primary | ICD-10-CM

## 2025-08-19 DIAGNOSIS — E78.00 PURE HYPERCHOLESTEROLEMIA: ICD-10-CM

## 2025-08-19 DIAGNOSIS — Z95.5 S/P DRUG ELUTING CORONARY STENT PLACEMENT: ICD-10-CM

## 2025-08-19 DIAGNOSIS — Z79.01 ANTICOAGULATED: ICD-10-CM

## 2025-08-19 PROCEDURE — 1126F AMNT PAIN NOTED NONE PRSNT: CPT | Mod: CPTII,S$GLB,, | Performed by: PHYSICIAN ASSISTANT

## 2025-08-19 PROCEDURE — 3288F FALL RISK ASSESSMENT DOCD: CPT | Mod: CPTII,S$GLB,, | Performed by: PHYSICIAN ASSISTANT

## 2025-08-19 PROCEDURE — 99214 OFFICE O/P EST MOD 30 MIN: CPT | Mod: S$GLB,,, | Performed by: PHYSICIAN ASSISTANT

## 2025-08-19 PROCEDURE — 99999 PR PBB SHADOW E&M-EST. PATIENT-LVL IV: CPT | Mod: PBBFAC,,, | Performed by: PHYSICIAN ASSISTANT

## 2025-08-19 PROCEDURE — 1101F PT FALLS ASSESS-DOCD LE1/YR: CPT | Mod: CPTII,S$GLB,, | Performed by: PHYSICIAN ASSISTANT

## 2025-08-19 PROCEDURE — 3078F DIAST BP <80 MM HG: CPT | Mod: CPTII,S$GLB,, | Performed by: PHYSICIAN ASSISTANT

## 2025-08-19 PROCEDURE — 1159F MED LIST DOCD IN RCRD: CPT | Mod: CPTII,S$GLB,, | Performed by: PHYSICIAN ASSISTANT

## 2025-08-19 PROCEDURE — 3075F SYST BP GE 130 - 139MM HG: CPT | Mod: CPTII,S$GLB,, | Performed by: PHYSICIAN ASSISTANT

## 2025-08-19 RX ORDER — TRIAMCINOLONE ACETONIDE 1 MG/G
CREAM TOPICAL
COMMUNITY
Start: 2025-06-10

## 2025-08-19 RX ORDER — FLUTICASONE PROPIONATE 50 MCG
SPRAY, SUSPENSION (ML) NASAL DAILY
COMMUNITY

## 2025-08-19 RX ORDER — ACETAMINOPHEN 500 MG
1 TABLET ORAL
COMMUNITY

## 2025-08-19 RX ORDER — VALSARTAN 160 MG/1
160 TABLET ORAL 2 TIMES DAILY
Qty: 180 TABLET | Refills: 3 | Status: SHIPPED | OUTPATIENT
Start: 2025-08-19 | End: 2026-08-19

## 2025-08-19 RX ORDER — LEVOTHYROXINE SODIUM 100 UG/1
100 TABLET ORAL
COMMUNITY
Start: 2025-06-26

## (undated) DEVICE — NDL BROCKENBROUGH ADULT

## (undated) DEVICE — TUBE LOW SORBING INFUSION 20DP

## (undated) DEVICE — INTRODUCER HEMOSTASIS 7.5F

## (undated) DEVICE — INTRO FAST-CATH SL1 8.5FR 63CM

## (undated) DEVICE — PAD DEFIB CADENCE ADULT R2

## (undated) DEVICE — PACK EP DRAPE OMC

## (undated) DEVICE — R CATH ACUSON ACUNAV 8FR

## (undated) DEVICE — SET SMARTABLATE IRR TUBE

## (undated) DEVICE — R CATH BIDIRECTIONL DF CRV 7FR

## (undated) DEVICE — COVER PRB TRNSDUC 7.6X183CM

## (undated) DEVICE — CATH OPTRELL TRUEREF MAP D-F

## (undated) DEVICE — PATCH CARTO REFERENCE

## (undated) DEVICE — LINE PRESSURE MONITORING 96IN

## (undated) DEVICE — STOPCOCK 3-WAY

## (undated) DEVICE — INTRO AGILIS MED CRL 8.5F 71CM

## (undated) DEVICE — SHEATH HEMOSTASIS 8.5FR

## (undated) DEVICE — CATH THERMOCOOL SMTCH SF D F

## (undated) DEVICE — KIT PROBE COVER WITH GEL

## (undated) DEVICE — SHEATH INTRODUCER 9FR 11CM

## (undated) DEVICE — NDL TRNSSPTL BRK-1 18GA 98CM

## (undated) DEVICE — BOWL FLUID - BACK STOP

## (undated) DEVICE — ELECTRODE REM PLYHSV RETURN 9